# Patient Record
Sex: FEMALE | Race: BLACK OR AFRICAN AMERICAN | NOT HISPANIC OR LATINO | Employment: FULL TIME | URBAN - METROPOLITAN AREA
[De-identification: names, ages, dates, MRNs, and addresses within clinical notes are randomized per-mention and may not be internally consistent; named-entity substitution may affect disease eponyms.]

---

## 2024-09-11 ENCOUNTER — INITIAL PRENATAL (OUTPATIENT)
Dept: OBSTETRICS AND GYNECOLOGY | Facility: CLINIC | Age: 34
End: 2024-09-11
Payer: COMMERCIAL

## 2024-09-11 VITALS — SYSTOLIC BLOOD PRESSURE: 133 MMHG | BODY MASS INDEX: 37.79 KG/M2 | WEIGHT: 220 LBS | DIASTOLIC BLOOD PRESSURE: 71 MMHG

## 2024-09-11 DIAGNOSIS — Z34.90 PRENATAL CARE, ANTEPARTUM (HHS-HCC): ICD-10-CM

## 2024-09-11 DIAGNOSIS — O09.529 ANTEPARTUM MULTIGRAVIDA OF ADVANCED MATERNAL AGE (HHS-HCC): Primary | ICD-10-CM

## 2024-09-11 DIAGNOSIS — Z3A.17 17 WEEKS GESTATION OF PREGNANCY (HHS-HCC): ICD-10-CM

## 2024-09-11 DIAGNOSIS — A60.9 HSV (HERPES SIMPLEX VIRUS) ANOGENITAL INFECTION: ICD-10-CM

## 2024-09-11 DIAGNOSIS — O99.210 MATERNAL OBESITY SYNDROME, ANTEPARTUM (HHS-HCC): ICD-10-CM

## 2024-09-11 DIAGNOSIS — Z36.3: ICD-10-CM

## 2024-09-11 PROBLEM — G43.909 MIGRAINES: Status: ACTIVE | Noted: 2024-09-11

## 2024-09-11 PROBLEM — Z91.410: Status: ACTIVE | Noted: 2024-09-11

## 2024-09-11 PROBLEM — R73.09 ELEVATED HEMOGLOBIN A1C: Status: ACTIVE | Noted: 2024-09-11

## 2024-09-11 PROBLEM — R89.8 ABNORMAL GENETIC TEST: Status: ACTIVE | Noted: 2024-09-11

## 2024-09-11 PROBLEM — Z80.41 FH: OVARIAN CANCER: Status: ACTIVE | Noted: 2024-09-11

## 2024-09-11 PROBLEM — C56.9 OVARIAN CANCER (MULTI): Status: RESOLVED | Noted: 2024-09-11 | Resolved: 2024-09-11

## 2024-09-11 PROBLEM — C56.9 OVARIAN CANCER (MULTI): Status: ACTIVE | Noted: 2024-09-11

## 2024-09-11 PROCEDURE — 0500F INITIAL PRENATAL CARE VISIT: CPT | Performed by: ADVANCED PRACTICE MIDWIFE

## 2024-09-11 ASSESSMENT — ENCOUNTER SYMPTOMS
NAUSEA: 0
FREQUENCY: 0
FLANK PAIN: 0
HEMATURIA: 0
BLOOD IN STOOL: 0
EYES NEGATIVE: 0
DIARRHEA: 0
FATIGUE: 0
PSYCHIATRIC NEGATIVE: 1
PSYCHIATRIC NEGATIVE: 0
GASTROINTESTINAL NEGATIVE: 0
NEUROLOGICAL NEGATIVE: 0
RESPIRATORY NEGATIVE: 1
RESPIRATORY NEGATIVE: 0
ABDOMINAL PAIN: 0
CONSTIPATION: 0
CONSTITUTIONAL NEGATIVE: 0
ANAL BLEEDING: 0
MUSCULOSKELETAL NEGATIVE: 1
FEVER: 0
ENDOCRINE NEGATIVE: 0
CHILLS: 0
OCCASIONAL FEELINGS OF UNSTEADINESS: 0
UNEXPECTED WEIGHT CHANGE: 0
CARDIOVASCULAR NEGATIVE: 0
RECTAL PAIN: 0
MUSCULOSKELETAL NEGATIVE: 0
HEMATOLOGIC/LYMPHATIC NEGATIVE: 0
LOSS OF SENSATION IN FEET: 0
VOMITING: 0
DYSURIA: 0
ALLERGIC/IMMUNOLOGIC NEGATIVE: 0
ACTIVITY CHANGE: 0
DEPRESSION: 0
DIFFICULTY URINATING: 0
ABDOMINAL DISTENTION: 0

## 2024-09-11 ASSESSMENT — EDINBURGH POSTNATAL DEPRESSION SCALE (EPDS)
I HAVE FELT SAD OR MISERABLE: NO, NOT AT ALL
I HAVE BEEN SO UNHAPPY THAT I HAVE BEEN CRYING: NO, NEVER
I HAVE FELT SCARED OR PANICKY FOR NO GOOD REASON: NO, NOT AT ALL
I HAVE BEEN ABLE TO LAUGH AND SEE THE FUNNY SIDE OF THINGS: AS MUCH AS I ALWAYS COULD
THE THOUGHT OF HARMING MYSELF HAS OCCURRED TO ME: NEVER
I HAVE BEEN ANXIOUS OR WORRIED FOR NO GOOD REASON: YES, SOMETIMES
TOTAL SCORE: 2
I HAVE LOOKED FORWARD WITH ENJOYMENT TO THINGS: AS MUCH AS I EVER DID
I HAVE BLAMED MYSELF UNNECESSARILY WHEN THINGS WENT WRONG: NO, NEVER
THINGS HAVE BEEN GETTING ON TOP OF ME: NO, I HAVE BEEN COPING AS WELL AS EVER
I HAVE BEEN SO UNHAPPY THAT I HAVE HAD DIFFICULTY SLEEPING: NOT AT ALL
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
## 2024-09-11 NOTE — PATIENT INSTRUCTIONS
TAKING GOOD CARE OF YOURSELF WHILE YOU ARE PREGNANT    What Should I Eat?  You do not have to eat a lot more food during pregnancy. But it is important to eat the right food--the most  healthy food for you and your baby. Every day, make sure you have:  6 to 8 large glasses of water.  6 to 9 servings of whole grain foods like bread or pasta. By reading the label, you will know that you are getting ‘‘whole’’ grain and not just brown-colored bread or pasta (1 slice of bread or a half cup of cooked pasta is a serving).  3 to 4 servings of fruit. Fresh, raw fruit is best (1 small apple or a half cup of chopped fruit is a serving).  4 to 5 servings of vegetables (1 medium carrot or a half cup of chopped vegetables is a serving).  2 to 3 servings of lean meat, fish, eggs, or nuts. (A piece ofmeat the size of a pack of playing cards is 1 serving.)  1 serving of vitamin C-rich food, like oranges, sweet peppers, or tomatoes (one half cup is a serving).  2 to 3 servings of iron-rich foods, like black-eyed peas, sweet potatoes, greens, dried fruit, or meat.  1 serving of a food rich in folic acid, like dark green, leafy vegetables (one half cup is a serving).    Are Some Foods Dangerous?  Most women can eat any food they want while they are pregnant. But there are some foods that can be  dangerous to the health of your baby.    Fish -- Fish is good food. And it is an important food for growing a smart baby. But some fish have lots of dangerous chemicals. To avoid these chemicals:  Do not eat swordfish, shark, jazmyn mackerel, or tilefish.  Eat salmon no more than 1 time per week.  Eat only ‘‘light’’ tuna. Do not eat albacore tuna.    Milk and cheese -- Dairy products are an important source of calcium, and calcium helps build strong bones and teeth. But some dairy products carry dangerous germs. To keep yourself and your baby safe, eat and drink only dairy products--such as milk, yogurt, and cheese--that are  pasteurized.    Prepared foods -- Any food that is spoiled or not cooked well can make you sick.  Do not eat any meat or fish that has not been cooked all the way through.  Do not eat any cooked food that has not been kept hot or chilled.  Wash knives, cutting boards, and your hands between handling raw meat and any other food--like fruits and vegetables--that you plan to eat raw.  Wash all fruits and vegetables with 1 tablespoon of vinegar in a pan of water to kill germs before you eat them.    Alcohol -- We know that alcohol is dangerous for your baby if you drink a lot during your pregnancy. It is safest to avoid all alcohol.    Coffee -- The most recent studies say that 2 cups of caffeinated drink each day is safe during pregnancy. This means 2 small cups of coffee or tea or 1 can of caffeinated soda.    Weight Gain  On average, the total amount of weight gain during pregnancy will fall in the following ranges:    Weight Type Average Pounds   Normal weight (BMI 18.5 - 24.9) 25 - 35 pounds   Underweight (BMI less than 18.5) 28 - 40 pounds   Overweight (BMI 25 - 29.9) 15 - 25 pounds   Obese (BMI greater or equal to 30) 11 - 20 pounds       Do I Need to Take Vitamins?  Even if your diet is good, a daily multivitamin is a good idea. All prenatal vitamins are pretty much the same,  so buy the cheapest kind. If you find that your vitamins upset your stomach, take a children’s chewable or gummy  vitamin. Be sure you get at least 400 micrograms of folic acid every day in the vitamin you chose. The number  of micrograms of folic acid is on the label of the bottle.    Is Exercise Important?  Yes! You are getting ready for an athletic event: labor! Daily exercise will help you stay fit, control your  weight, and be prepared for labor. Every day, try to get at least 30 minutes of moderate exercise like walking  or swimming. Do deep squats several times a day. This exercise will help control low back pain and help  prepare  your pelvis for delivery.    Are Some Exercises Dangerous?  You can continue to do pretty much any exercise you have been doing. It is important to avoid any danger of  blows to your stomach. You should avoid scuba diving and contact sports.    What if I Get Sick--Can I Take Medicine?  It is important to limit the medicines you take as much as possible. It is safe to take acetaminophen  (Tylenol). Avoid NSAIDs like ibuprofen (Motrin) and naproxen (Aleve).    Head cold -- Drink lots of fluids, gargle with warm salt water, take warm baths or showers, take Tylenol for headache and sore throat, suck on throat lozenges    Headaches -- Drink at least 8 big glasses of water every day, eat something healthy every 2 to 3 hours during the day, and take Tylenol    Constipation -- Drink lots of water, eat lots of fruits and vegetables, including dried fruits like prunes, and use a fiber supplement like Metamucil    Are There Danger Signs That I Need to Watch Out For?  Call your health care provider if:  You start to bleed like a period  You are leaking fluid  Your baby is not moving (after 24 weeks into your pregnancy)  You are having very bad headaches or your vision is blurry or you see ‘‘spots’’  You are having very bad pain  You are feeling very frightened or sad  You are very worried about something  NIPT testing:  NIPT Summary of Recommendations  Prenatal genetic screening (serum screening with or without nuchal translucency [NT] ultrasound or cell-free DNA screening) and diagnostic testing (chorionic villus sampling [CVS] or amniocentesis) options should be discussed and offered to all pregnant patients regardless of maternal age or risk of chromosomal abnormality. After review and discussion, every patient has the right to pursue or decline prenatal genetic screening and diagnostic testing.  If screening is accepted, patients should have one prenatal screening approach, and should not have multiple screening tests  performed simultaneously.  Cell-free DNA is the most sensitive and specific screening test for the common fetal aneuploidies. Nevertheless, it has the potential for false-positive and false-negative results. Furthermore, cell-free DNA testing is not equivalent to diagnostic testing.  All patients should be offered a second-trimester ultrasound for fetal structural defects, since these may occur with or without fetal aneuploidy; ideally this procedure is performed between 18 and 22 weeks of gestation (with or without second?trimester maternal serum alpha?fetoprotein).  Patients with a positive screening test result for fetal aneuploidy should undergo genetic counseling and a comprehensive ultrasound evaluation with an opportunity for diagnostic testing to confirm results.  Patients with a negative screening test result should be made aware that this substantially decreases their risk of the targeted aneuploidy but does not ensure that the fetus is unaffected. The potential for a fetus to be affected by genetic disorders that are not evaluated by the screening or diagnostic test should also be reviewed. Even if patients have a negative screening test result, they may choose diagnostic testing later in pregnancy, particularly if additional findings such as fetal anomalies identified on ultrasound examination become evident.  Patients whose cell-free DNA screening test results are not reported by the laboratory or are uninterpretable (a no?call test result) should be informed that test failure is associated with an increased risk of aneuploidy, receive further genetic counseling and be offered comprehensive ultrasound evaluation and diagnostic testing.  If an enlarged nuchal translucency or an anomaly is identified on ultrasound examination, the patient should be offered genetic counseling and diagnostic testing for genetic conditions and a comprehensive ultrasound evaluation including detailed ultrasonography at 18-22  weeks of gestation to assess for structural abnormalities.  The use of cell-free DNA screening as follow-up for patients with a screen positive serum analyte screening test result is an option for patients who want to avoid a diagnostic test. However, patients should be informed that this approach may delay definitive diagnosis and will fail to identify some fetuses with chromosomal abnormalities.  In clinical situations of an isolated soft ultrasonographic marker (such as echogenic cardiac focus, choroid plexus cyst, pyelectasis, short humerus or femur length) where aneuploidy screening has not been performed, the patient should be counseled regarding the risk of aneuploidy associated with the finding and cell-free DNA, quad screen testing, or amniocentesis should be offered. If aneuploidy testing is performed and is low risk, then no further risk assessment is needed. If more than one marker is identified, then genetic counseling, maternal-fetal medicine consultation, or both are recommended.  No method of aneuploidy screening that includes a serum sample is as accurate in twin gestations as it is in [place] pregnancies; this information should be incorporated into pretest counseling for patients with multiple gestations.  Cell-free DNA screening can be performed in twin pregnancies. Overall, performance of screening for trisomy 21 by cell-free DNA in twin pregnancies is encouraging, but the total number of reported affected cases is small. Given the small number of affected cases it is difficult to determine an accurate detection rate for trisomy 18 and 13.  Because preimplantation genetic testing is not uniformly accurate, prenatal screening and prenatal diagnosis should be offered to all patients regardless of previous preimplantation genetic testing.  The use of multiple serum screening approaches performed independently (eg, a first-trimester screening test followed by a quad screen as an unlinked test) is  not recommended because it will result in an unacceptably high positive screening rate and could deliver contradictory risk estimates.  In multifetal gestations, if a fetal demise, vanishing twin, or anomaly is identified in one fetus, there is a significant risk of an inaccurate test result if serum-based aneuploidy screening or cell-free DNA is used. This information should be reviewed with the patient and diagnostic testing should be offered.  Patients with unusual or multiple aneuploidies detected by cell-free DNA should be referred for genetic counseling and maternal-fetal medicine consultation.  Our contact information is below in case you or your family need to call:  Your health care provider’s name: RUSSEL Shaikh  Your health care provider’s phone number: [phone]

## 2024-09-23 ENCOUNTER — HOSPITAL ENCOUNTER (OUTPATIENT)
Dept: RADIOLOGY | Facility: CLINIC | Age: 34
Discharge: HOME | End: 2024-09-23
Payer: COMMERCIAL

## 2024-09-23 DIAGNOSIS — Z34.90 PRENATAL CARE, ANTEPARTUM (HHS-HCC): ICD-10-CM

## 2024-09-23 PROCEDURE — 76811 OB US DETAILED SNGL FETUS: CPT

## 2024-09-23 PROCEDURE — 76811 OB US DETAILED SNGL FETUS: CPT | Performed by: OBSTETRICS & GYNECOLOGY

## 2024-09-25 ENCOUNTER — APPOINTMENT (OUTPATIENT)
Dept: OBSTETRICS AND GYNECOLOGY | Facility: CLINIC | Age: 34
End: 2024-09-25
Payer: COMMERCIAL

## 2024-10-02 ENCOUNTER — ROUTINE PRENATAL (OUTPATIENT)
Dept: OBSTETRICS AND GYNECOLOGY | Facility: CLINIC | Age: 34
End: 2024-10-02
Payer: COMMERCIAL

## 2024-10-02 VITALS — SYSTOLIC BLOOD PRESSURE: 121 MMHG | WEIGHT: 220 LBS | DIASTOLIC BLOOD PRESSURE: 65 MMHG | BODY MASS INDEX: 37.79 KG/M2

## 2024-10-02 DIAGNOSIS — O99.210 MATERNAL OBESITY SYNDROME, ANTEPARTUM (HHS-HCC): ICD-10-CM

## 2024-10-02 DIAGNOSIS — O09.529 ANTEPARTUM MULTIGRAVIDA OF ADVANCED MATERNAL AGE (HHS-HCC): Primary | ICD-10-CM

## 2024-10-02 DIAGNOSIS — Z3A.20 20 WEEKS GESTATION OF PREGNANCY (HHS-HCC): ICD-10-CM

## 2024-10-02 DIAGNOSIS — R89.8 ABNORMAL GENETIC TEST: ICD-10-CM

## 2024-10-02 DIAGNOSIS — R73.09 ELEVATED HEMOGLOBIN A1C: ICD-10-CM

## 2024-10-02 DIAGNOSIS — A60.9 HSV (HERPES SIMPLEX VIRUS) ANOGENITAL INFECTION: ICD-10-CM

## 2024-10-02 PROCEDURE — 0501F PRENATAL FLOW SHEET: CPT | Performed by: ADVANCED PRACTICE MIDWIFE

## 2024-10-02 NOTE — PROGRESS NOTES
"Assessment/Plan   Diagnoses and all orders for this visit:  Elevated hemoglobin A1c  Antepartum multigravida of advanced maternal age ([facility])  Maternal obesity syndrome, antepartum ([facility])  HSV (herpes simplex virus) anogenital infection  Abnormal genetic test  20 weeks gestation of pregnancy ([facility])      Reviewed lab results 3 hr gct ramon, will repeat 3 hr at 26 weeks   Anatomy US reviewed repeat sono scheduled r/t non vis anatomy     Reviewed s/sx of PTL, warning signs, fetal movement counts, and when to call provider  Follow up in 4 weeks for a routine prenatal visit.    RUSSEL Badillo    Subjective     Geraldine Gaines \"Heena\" is a 34 y.o.  at 20w6d with a working estimated date of delivery of 2025, by Last Menstrual Period who presents for a routine prenatal visit. She denies vaginal bleeding, leakage of fluid, decreased fetal movements, or contractions.    Her pregnancy is complicated by:  16w2d Problems (from 24 to present)       Problem Noted Resolved    Elevated hemoglobin A1c 2024 by RUSSEL Badillo No    Priority:  Medium      Overview Addendum 2024  2:31 PM by RUSSEL Shaikh     2024: 6.2 at NOB. 3 hr gct normal SC           Maternal obesity syndrome, antepartum ([facility]) 2024 by RUSSEL Badillo No    Priority:  Medium      Overview Signed 2024  1:50 PM by RUSSEL Shaikh     2024: BMI = >40 at NOB    Fetal surveillance BMI >40 at NOB:  Growth US at 30 and 36 wks  BPP or NST weekly 34 wks to delivery    Timing of delivery: 39 0/7 - 39 6/7 wks  *BMI >= 50 at any time in pregnancy: Deliver at Level 4             Abnormal genetic test 2024 by RUSSEL Badillo No    Priority:  Medium      Overview Signed 2024  1:50 PM by RUSSEL Shaikh     2024: Increased risk for SMA carrier status. Genetic consult complete. SC           Migraines 2024 by Kacey NUÑEZ" RUSSEL Forte No    Priority:  Medium      Overview Signed 2024  1:59 PM by RUSSEL Shaikh     2024: Tylenol PRN           FH: ovarian cancer 2024 by RUSSEL Badillo No    Priority:  Medium      Overview Signed 2024  2:34 PM by RUSSEL Shaikh     2024: Maternal aunt passed away at age 44 from ovarian cancer. Pt mother is a carrier and had a hysterectomy. Pt has not yet been tested. Will plan My Risk postpartum. SC           Ovarian cancer (Multi) 2024 by RUSSEL Badillo 2024 by RUSSEL Badillo    Priority:  Medium      Overview Signed 2024  2:33 PM by RUSSEL Shaikh     .td                  Objective   Physical Exam  Weight: 99.8 kg (220 lb)  TWG: Not found.  Expected Total Weight Gain: Could not be calculated   Pregravid BMI: Could not be calculated  BP: 121/65         Postpartum Depression: Low Risk  (2024)    [place]  Depression Scale     Last EPDS Total Score: 2     Last EPDS Self Harm Result: Never       Prenatal Labs  Lab Results   Component Value Date    HGB 13.2 02/10/2022    HCT 39.6 02/10/2022     02/10/2022       Imaging  Anatomy sono reviewed     RUSSEL Badillo

## 2024-10-14 ENCOUNTER — APPOINTMENT (OUTPATIENT)
Dept: RADIOLOGY | Facility: CLINIC | Age: 34
End: 2024-10-14
Payer: COMMERCIAL

## 2024-10-16 ENCOUNTER — HOSPITAL ENCOUNTER (OUTPATIENT)
Dept: RADIOLOGY | Facility: HOSPITAL | Age: 34
Discharge: HOME | End: 2024-10-16
Payer: COMMERCIAL

## 2024-10-16 DIAGNOSIS — Z34.90 PRENATAL CARE, ANTEPARTUM (HHS-HCC): ICD-10-CM

## 2024-10-16 PROCEDURE — 76816 OB US FOLLOW-UP PER FETUS: CPT

## 2024-10-30 ENCOUNTER — APPOINTMENT (OUTPATIENT)
Dept: OBSTETRICS AND GYNECOLOGY | Facility: CLINIC | Age: 34
End: 2024-10-30
Payer: COMMERCIAL

## 2024-10-30 VITALS — BODY MASS INDEX: 38.07 KG/M2 | WEIGHT: 221.6 LBS | DIASTOLIC BLOOD PRESSURE: 68 MMHG | SYSTOLIC BLOOD PRESSURE: 110 MMHG

## 2024-10-30 DIAGNOSIS — O09.529 ANTEPARTUM MULTIGRAVIDA OF ADVANCED MATERNAL AGE (HHS-HCC): Primary | ICD-10-CM

## 2024-10-30 DIAGNOSIS — A60.9 HSV (HERPES SIMPLEX VIRUS) ANOGENITAL INFECTION: ICD-10-CM

## 2024-10-30 DIAGNOSIS — R89.8 ABNORMAL GENETIC TEST: ICD-10-CM

## 2024-10-30 DIAGNOSIS — Z3A.24 24 WEEKS GESTATION OF PREGNANCY (HHS-HCC): ICD-10-CM

## 2024-10-30 DIAGNOSIS — O99.210 MATERNAL OBESITY SYNDROME, ANTEPARTUM (HHS-HCC): ICD-10-CM

## 2024-10-30 DIAGNOSIS — R73.09 ELEVATED HEMOGLOBIN A1C: ICD-10-CM

## 2024-10-30 PROCEDURE — 0501F PRENATAL FLOW SHEET: CPT | Performed by: ADVANCED PRACTICE MIDWIFE

## 2024-11-22 ENCOUNTER — LAB (OUTPATIENT)
Dept: LAB | Facility: LAB | Age: 34
End: 2024-11-22
Payer: COMMERCIAL

## 2024-11-22 DIAGNOSIS — Z34.90 PRENATAL CARE, ANTEPARTUM (HHS-HCC): ICD-10-CM

## 2024-11-22 LAB
ERYTHROCYTE [DISTWIDTH] IN BLOOD BY AUTOMATED COUNT: 12.6 % (ref 11.5–14.5)
GLUCOSE 1H P 50 G GLC PO SERPL-MCNC: 84 MG/DL
HCT VFR BLD AUTO: 39.1 % (ref 36–46)
HCV AB SER QL: NONREACTIVE
HGB BLD-MCNC: 13.3 G/DL (ref 12–16)
MCH RBC QN AUTO: 32.6 PG (ref 26–34)
MCHC RBC AUTO-ENTMCNC: 34 G/DL (ref 32–36)
MCV RBC AUTO: 96 FL (ref 80–100)
NRBC BLD-RTO: 0 /100 WBCS (ref 0–0)
PLATELET # BLD AUTO: 223 X10*3/UL (ref 150–450)
RBC # BLD AUTO: 4.08 X10*6/UL (ref 4–5.2)
TREPONEMA PALLIDUM IGG+IGM AB [PRESENCE] IN SERUM OR PLASMA BY IMMUNOASSAY: NONREACTIVE
WBC # BLD AUTO: 7.9 X10*3/UL (ref 4.4–11.3)

## 2024-11-22 PROCEDURE — 36415 COLL VENOUS BLD VENIPUNCTURE: CPT

## 2024-11-22 PROCEDURE — 86780 TREPONEMA PALLIDUM: CPT

## 2024-11-22 PROCEDURE — 86803 HEPATITIS C AB TEST: CPT

## 2024-11-22 PROCEDURE — 85027 COMPLETE CBC AUTOMATED: CPT

## 2024-11-22 PROCEDURE — 82947 ASSAY GLUCOSE BLOOD QUANT: CPT

## 2024-11-27 ENCOUNTER — APPOINTMENT (OUTPATIENT)
Dept: OBSTETRICS AND GYNECOLOGY | Facility: CLINIC | Age: 34
End: 2024-11-27
Payer: COMMERCIAL

## 2024-12-05 ENCOUNTER — HOSPITAL ENCOUNTER (OUTPATIENT)
Dept: RADIOLOGY | Facility: HOSPITAL | Age: 34
Discharge: HOME | End: 2024-12-05
Payer: COMMERCIAL

## 2024-12-05 DIAGNOSIS — O36.5930 MATERNAL CARE FOR OTHER KNOWN OR SUSPECTED POOR FETAL GROWTH, THIRD TRIMESTER, NOT APPLICABLE OR UNSPECIFIED: ICD-10-CM

## 2024-12-05 DIAGNOSIS — O99.213 OBESITY COMPLICATING PREGNANCY, THIRD TRIMESTER (HHS-HCC): ICD-10-CM

## 2024-12-05 DIAGNOSIS — Z34.90 PRENATAL CARE, ANTEPARTUM (HHS-HCC): ICD-10-CM

## 2024-12-05 PROCEDURE — 76816 OB US FOLLOW-UP PER FETUS: CPT

## 2024-12-05 PROCEDURE — 76820 UMBILICAL ARTERY ECHO: CPT

## 2024-12-05 PROCEDURE — 76819 FETAL BIOPHYS PROFIL W/O NST: CPT

## 2024-12-06 PROBLEM — O36.5990 POOR FETAL GROWTH AFFECTING MANAGEMENT OF MOTHER, ANTEPARTUM (HHS-HCC): Status: ACTIVE | Noted: 2024-12-06

## 2024-12-11 ENCOUNTER — ROUTINE PRENATAL (OUTPATIENT)
Dept: OBSTETRICS AND GYNECOLOGY | Facility: CLINIC | Age: 34
End: 2024-12-11
Payer: COMMERCIAL

## 2024-12-11 VITALS — WEIGHT: 216 LBS | BODY MASS INDEX: 37.1 KG/M2 | SYSTOLIC BLOOD PRESSURE: 118 MMHG | DIASTOLIC BLOOD PRESSURE: 70 MMHG

## 2024-12-11 DIAGNOSIS — O99.210 MATERNAL OBESITY SYNDROME, ANTEPARTUM (HHS-HCC): ICD-10-CM

## 2024-12-11 DIAGNOSIS — R89.8 ABNORMAL GENETIC TEST: ICD-10-CM

## 2024-12-11 DIAGNOSIS — Z3A.30 30 WEEKS GESTATION OF PREGNANCY (HHS-HCC): ICD-10-CM

## 2024-12-11 DIAGNOSIS — O09.529 ANTEPARTUM MULTIGRAVIDA OF ADVANCED MATERNAL AGE (HHS-HCC): Primary | ICD-10-CM

## 2024-12-11 DIAGNOSIS — O36.5990 POOR FETAL GROWTH AFFECTING MANAGEMENT OF MOTHER, ANTEPARTUM, SINGLE OR UNSPECIFIED FETUS (HHS-HCC): ICD-10-CM

## 2024-12-11 DIAGNOSIS — A60.9 HSV (HERPES SIMPLEX VIRUS) ANOGENITAL INFECTION: ICD-10-CM

## 2024-12-11 PROCEDURE — 0501F PRENATAL FLOW SHEET: CPT | Performed by: ADVANCED PRACTICE MIDWIFE

## 2024-12-11 RX ORDER — ACYCLOVIR 200 MG/1
CAPSULE ORAL
COMMUNITY
Start: 2021-11-03

## 2024-12-11 NOTE — PROGRESS NOTES
"Assessment/Plan   Diagnoses and all orders for this visit:  Antepartum multigravida of advanced maternal age ([facility])  30 weeks gestation of pregnancy ([facility])  Maternal obesity syndrome, antepartum ([facility])  Poor fetal growth affecting management of mother, antepartum, single or unspecified fetus ([facility])  HSV (herpes simplex virus) anogenital infection  Abnormal genetic test      Reviewed lab results third trimester testing all WNL  Reviewed growth US new diagnosis of IUGR, scheduled for weekly testing with MFM, transfer to OB for care   Postpartum contraception options discussed, undecided, likely planning condoms/withdrawal   Reviewed s/sx of PTL, warning signs, fetal movement counts, and when to call provider  Follow up in 2 week for a routine prenatal visit.    RUSSEL Badillo    Tyra Gaines \"Heena\" is a 34 y.o.  at 30w6d with a working estimated date of delivery of 2025, by Last Menstrual Period who presents for a routine prenatal visit. She denies vaginal bleeding, leakage of fluid, decreased fetal movements, or contractions.    Her pregnancy is complicated by:  16w2d Problems (from 24 to present)       Problem Noted Diagnosed Resolved    Poor fetal growth affecting management of mother, antepartum ([facility]) 2024 by RUSSEL Badillo  No    Priority:  Medium       Overview Addendum 2024 10:59 AM by RUSSEL Badillo     24: Decreased interval fetal growth at 30 weeks. The Ac is at the 2%, and the EFW is at the 7% percentile Repeat doppler, BPP and AFV evaluation scheduled weekly per MFM. SD         30 weeks gestation of pregnancy ([facility]) 2024 by RUSSEL Badillo  No    Priority:  Medium       Overview Addendum 2024  2:48 PM by RUSSEL Badillo     2024: Desired provider in labor: [] CNM  [x] Physician  [x] Initial BMI: >40  [x] Prenatal Labs: Done at [facility]   [x] Rh status: " positive  [x] Genetic Screening:  Increased risk for SMA but declines additional testing, NIPYT not done   [x] Baby ASA  [x] Dating confirmation with US    [x] Anatomy US: ordered   [] Patient added to BirthTracks  [] 1hr GCT at 24-28wks:  [] 3 hr GTT (if indicated):  [] Rhogam (if indicated):   [] Fetal Surveillance (if indicated):    [] Tdap (27-36wks):  [] RSV (32-36wks)  [] Flu Shot:  [] COVID vaccine:     [] Breastfeeding  [] Pain management during labor:   [] Postpartum Birth control method:     [] GBS at 36 wks:  [] 39 weeks discussion of IOL vs. Expectant management:  [] Mode of delivery:              Elevated hemoglobin A1c 9/11/2024 by RUSSEL Badillo  No    Priority:  Medium       Overview Addendum 9/11/2024  2:31 PM by RUSSEL Shaikh     9/11/2024: 6.2 at NOB. 3 hr gct normal SC           Maternal obesity syndrome, antepartum ([facility]) 9/11/2024 by RUSSEL Badillo  No    Priority:  Medium       Overview Signed 9/11/2024  1:50 PM by RUSSEL Shaikh     9/11/2024: BMI = >40 at NOB    Fetal surveillance BMI >40 at NOB:  Growth US at 30 and 36 wks  BPP or NST weekly 34 wks to delivery    Timing of delivery: 39 0/7 - 39 6/7 wks  *BMI >= 50 at any time in pregnancy: Deliver at Level 4             Abnormal genetic test 9/11/2024 by RUSSEL Badillo  No    Priority:  Medium       Overview Signed 9/11/2024  1:50 PM by RUSSEL Shaikh     9/11/2024: Increased risk for SMA carrier status. Genetic consult complete. SC           Migraines 9/11/2024 by RUSSEL Badillo  No    Priority:  Medium       Overview Signed 9/11/2024  1:59 PM by RUSSEL Shaikh     9/11/2024: Tylenol PRN           FH: ovarian cancer 9/11/2024 by RUSSEL Badillo  No    Priority:  Medium       Overview Signed 9/11/2024  2:34 PM by MINA Shaikh-NAYELY     9/11/2024: Maternal aunt passed away at age 44 from ovarian cancer. Pt mother is a  "carrier and had a hysterectomy. Pt has not yet been tested. Will plan My Risk postpartum. SC           Ovarian cancer (Multi) 2024 by RUSSEL Badillo  2024 by RUSSEL Badillo    Priority:  Medium       Overview Signed 2024  2:33 PM by RUSSEL Shaikh     .td                  Objective   Physical Exam:   Weight: 98 kg (216 lb)  TWG: Not found.  Expected Total Weight Gain: Could not be calculated   Pregravid BMI: Could not be calculated  BP: 118/70  Fetal Heart Rate: 155 Fundal Height (cm): 31 cm Presentation: Vertex           Postpartum Depression: Low Risk  (2024)    [place]  Depression Scale     Last EPDS Total Score: 2     Last EPDS Self Harm Result: Never        Prenatal Labs  Lab Results   Component Value Date    HGB 13.3 2024    HCT 39.1 2024     2024    SYPHT Nonreactive 2024     Lab Results   Component Value Date    GLUC1P 84 2024     No results found for: \"GRPBSTREP\"     Imaging  Reviewed new diagnosis of IUGR and need for weeky testing., Scheduled with MFM.   Routine prenatal care with OB    RUSSEL Badillo    "

## 2024-12-12 ENCOUNTER — HOSPITAL ENCOUNTER (OUTPATIENT)
Dept: RADIOLOGY | Facility: HOSPITAL | Age: 34
Discharge: HOME | End: 2024-12-12
Payer: COMMERCIAL

## 2024-12-12 DIAGNOSIS — O99.210 MATERNAL OBESITY SYNDROME, ANTEPARTUM (HHS-HCC): ICD-10-CM

## 2024-12-12 DIAGNOSIS — O36.5930 MATERNAL CARE FOR OTHER KNOWN OR SUSPECTED POOR FETAL GROWTH, THIRD TRIMESTER, NOT APPLICABLE OR UNSPECIFIED: ICD-10-CM

## 2024-12-12 DIAGNOSIS — O09.529 ANTEPARTUM MULTIGRAVIDA OF ADVANCED MATERNAL AGE (HHS-HCC): ICD-10-CM

## 2024-12-12 PROCEDURE — 76820 UMBILICAL ARTERY ECHO: CPT | Performed by: MEDICAL GENETICS

## 2024-12-12 PROCEDURE — 76819 FETAL BIOPHYS PROFIL W/O NST: CPT | Performed by: MEDICAL GENETICS

## 2024-12-12 PROCEDURE — 76815 OB US LIMITED FETUS(S): CPT | Performed by: MEDICAL GENETICS

## 2024-12-12 PROCEDURE — 76815 OB US LIMITED FETUS(S): CPT

## 2024-12-12 PROCEDURE — 76819 FETAL BIOPHYS PROFIL W/O NST: CPT

## 2024-12-19 ENCOUNTER — HOSPITAL ENCOUNTER (OUTPATIENT)
Dept: RADIOLOGY | Facility: HOSPITAL | Age: 34
Discharge: HOME | End: 2024-12-19
Payer: COMMERCIAL

## 2024-12-19 DIAGNOSIS — Z34.90 PRENATAL CARE, ANTEPARTUM (HHS-HCC): ICD-10-CM

## 2024-12-19 PROCEDURE — 76820 UMBILICAL ARTERY ECHO: CPT

## 2024-12-19 PROCEDURE — 76815 OB US LIMITED FETUS(S): CPT

## 2024-12-19 PROCEDURE — 76819 FETAL BIOPHYS PROFIL W/O NST: CPT | Performed by: MEDICAL GENETICS

## 2024-12-19 PROCEDURE — 76820 UMBILICAL ARTERY ECHO: CPT | Performed by: MEDICAL GENETICS

## 2024-12-26 ENCOUNTER — APPOINTMENT (OUTPATIENT)
Dept: RADIOLOGY | Facility: HOSPITAL | Age: 34
End: 2024-12-26
Payer: COMMERCIAL

## 2024-12-26 ENCOUNTER — HOSPITAL ENCOUNTER (OUTPATIENT)
Dept: RADIOLOGY | Facility: HOSPITAL | Age: 34
Discharge: HOME | End: 2024-12-26
Payer: COMMERCIAL

## 2024-12-26 DIAGNOSIS — Z34.90 PRENATAL CARE, ANTEPARTUM (HHS-HCC): ICD-10-CM

## 2024-12-26 DIAGNOSIS — Z34.90 ENCOUNTER FOR SUPERVISION OF NORMAL PREGNANCY, UNSPECIFIED, UNSPECIFIED TRIMESTER: ICD-10-CM

## 2024-12-26 PROCEDURE — 76816 OB US FOLLOW-UP PER FETUS: CPT | Performed by: OBSTETRICS & GYNECOLOGY

## 2024-12-26 PROCEDURE — 76816 OB US FOLLOW-UP PER FETUS: CPT

## 2024-12-26 PROCEDURE — 76819 FETAL BIOPHYS PROFIL W/O NST: CPT | Performed by: OBSTETRICS & GYNECOLOGY

## 2025-01-03 PROBLEM — Z3A.34 34 WEEKS GESTATION OF PREGNANCY (HHS-HCC): Status: ACTIVE | Noted: 2024-09-11

## 2025-01-03 NOTE — PROGRESS NOTES
"Assessment/Plan   Diagnoses and all orders for this visit:  Antepartum multigravida of advanced maternal age ([facility])  Maternal obesity syndrome, antepartum ([facility])  Poor fetal growth affecting management of mother, antepartum, single or unspecified fetus ([facility])  HSV (herpes simplex virus) anogenital infection  34 weeks gestation of pregnancy ([facility])      Reviewed growth US  33 week sono to follow up on FGR than was noted at 30 weeks: Normal interval growth with FGR no longer identified with AC at 17% and overall growth in the 16%. Additional Repeat sono scheduled for 36 weeks.     Pt has been sick with virus for the last two weeks with nausea/vomiting/fever/fatigue. Zofran sent. Discussed bland diet for the next 24-48 hours.     Reviewed s/sx of PTL, warning signs, fetal movement counts, and when to call provider  Follow up in 2 week for a routine prenatal visit.    RUSSEL Badillo    Subjective     Geraldine Gaines \"Heena\" is a 34 y.o.  at 34w1d with a working estimated date of delivery of 2025, by Last Menstrual Period who presents for a routine prenatal visit. She denies vaginal bleeding, leakage of fluid, decreased fetal movements, or contractions.    Her pregnancy is complicated by:  16w2d Problems (from 24 to present)       Problem Noted Diagnosed Resolved    Poor fetal growth affecting management of mother, antepartum ([facility]) 2024 by RUSSEL Badillo  No    Priority:  Medium       Overview Addendum 2024  1:10 PM by RUSSEL Badillo     24: Decreased interval fetal growth at 30 weeks. The Ac is at the 2%, and the EFW is at the 7% percentile Repeat doppler, BPP and AFV evaluation scheduled weekly per [facility]. SD  24: at 33 weeks EFW 16th% with AC at 17th%. SD         34 weeks gestation of pregnancy ([facility]) 2024 by RUSSEL Badillo  No    Priority:  Medium       Overview Addendum 2024  3:21 PM by Kacey NUÑEZ" RUSSEL Forte     9/11/2024: Desired provider in labor: [x] CNM  [x] Physician   [x] Initial BMI: >40  [x] Prenatal Labs: Done at [facility]   [x] Rh status: positive  [x] Genetic Screening:  Increased risk for SMA but declines additional testing, NIPYT not done   [x] Baby ASA  [x] Dating confirmation with US    [x] Anatomy US: ordered   [] Patient added to BirthTracks: Doc pt   [x] 1hr GCT at 24-28wks: normal   [] 3 hr GTT (if indicated):  [x] Rhogam (if indicated): A pos   [x] Fetal Surveillance (if indicated):weekly BPP, weekly dopplers, serial growth     [] Tdap (27-36wks): considering   [] RSV (32-36wks) considering   [] Flu Shot:  [x] COVID vaccine: declines     [x] Breastfeeding  [] Pain management during labor:   [x] Postpartum Birth control method: withdrawl/condoms     [] GBS at 36 wks:  [] 39 weeks discussion of IOL vs. Expectant management:  [] Mode of delivery:              Elevated hemoglobin A1c 9/11/2024 by RUSSEL Badillo  No    Priority:  Medium       Overview Addendum 9/11/2024  2:31 PM by RUSSEL Shaikh     9/11/2024: 6.2 at NOB. 3 hr gct normal SC           Maternal obesity syndrome, antepartum ([facility]) 9/11/2024 by RUSSEL Badillo  No    Priority:  Medium       Overview Signed 9/11/2024  1:50 PM by RUSSEL Shaikh     9/11/2024: BMI = >40 at NOB    Fetal surveillance BMI >40 at NOB:  Growth US at 30 and 36 wks  BPP or NST weekly 34 wks to delivery    Timing of delivery: 39 0/7 - 39 6/7 wks  *BMI >= 50 at any time in pregnancy: Deliver at Level 4             Abnormal genetic test 9/11/2024 by RUSSEL Badillo  No    Priority:  Medium       Overview Signed 9/11/2024  1:50 PM by RUSSEL Shaikh     9/11/2024: Increased risk for SMA carrier status. Genetic consult complete. SC           Migraines 9/11/2024 by RUSSEL Badillo  No    Priority:  Medium       Overview Signed 9/11/2024  1:59 PM by RUSSEL Shaikh      "2024: Tylenol PRN           FH: ovarian cancer 2024 by RUSSEL Badillo  No    Priority:  Medium       Overview Signed 2024  2:34 PM by RUSSEL Shaikh     2024: Maternal aunt passed away at age 44 from ovarian cancer. Pt mother is a carrier and had a hysterectomy. Pt has not yet been tested. Will plan My Risk postpartum. SC           Ovarian cancer (Multi) 2024 by RUSSEL Badillo  2024 by RUSSEL Badillo    Priority:  Medium       Overview Signed 2024  2:33 PM by RUSSEL Shaikh     .td                  Objective   Physical Exam:      TWG: Not found.  Expected Total Weight Gain: Could not be calculated   Pregravid BMI: Could not be calculated                     Postpartum Depression: Low Risk  (2024)    [place]  Depression Scale     Last EPDS Total Score: 2     Last EPDS Self Harm Result: Never        Prenatal Labs  Lab Results   Component Value Date    HGB 13.3 2024    HCT 39.1 2024     2024    SYPHT Nonreactive 2024     Lab Results   Component Value Date    GLUC1P 84 2024     No results found for: \"GRPBSTREP\"     Imaging  36 weeks sono scheduled.   WU in 2 weeks     RUSSEL Badillo    "

## 2025-01-08 ENCOUNTER — PHARMACY VISIT (OUTPATIENT)
Dept: PHARMACY | Facility: CLINIC | Age: 35
End: 2025-01-08
Payer: COMMERCIAL

## 2025-01-08 ENCOUNTER — ROUTINE PRENATAL (OUTPATIENT)
Dept: OBSTETRICS AND GYNECOLOGY | Facility: CLINIC | Age: 35
End: 2025-01-08
Payer: COMMERCIAL

## 2025-01-08 VITALS — WEIGHT: 202 LBS | BODY MASS INDEX: 34.7 KG/M2 | DIASTOLIC BLOOD PRESSURE: 69 MMHG | SYSTOLIC BLOOD PRESSURE: 113 MMHG

## 2025-01-08 DIAGNOSIS — O21.9 NAUSEA AND VOMITING IN PREGNANCY PRIOR TO 22 WEEKS GESTATION: ICD-10-CM

## 2025-01-08 DIAGNOSIS — O36.5990 POOR FETAL GROWTH AFFECTING MANAGEMENT OF MOTHER, ANTEPARTUM, SINGLE OR UNSPECIFIED FETUS (HHS-HCC): ICD-10-CM

## 2025-01-08 DIAGNOSIS — O99.210 MATERNAL OBESITY SYNDROME, ANTEPARTUM (HHS-HCC): ICD-10-CM

## 2025-01-08 DIAGNOSIS — A60.9 HSV (HERPES SIMPLEX VIRUS) ANOGENITAL INFECTION: ICD-10-CM

## 2025-01-08 DIAGNOSIS — O09.529 ANTEPARTUM MULTIGRAVIDA OF ADVANCED MATERNAL AGE (HHS-HCC): Primary | ICD-10-CM

## 2025-01-08 DIAGNOSIS — Z3A.34 34 WEEKS GESTATION OF PREGNANCY (HHS-HCC): ICD-10-CM

## 2025-01-08 PROCEDURE — RXMED WILLOW AMBULATORY MEDICATION CHARGE

## 2025-01-08 PROCEDURE — 0501F PRENATAL FLOW SHEET: CPT | Performed by: ADVANCED PRACTICE MIDWIFE

## 2025-01-08 RX ORDER — ONDANSETRON 4 MG/1
4 TABLET, FILM COATED ORAL EVERY 8 HOURS PRN
Qty: 30 TABLET | Refills: 1 | Status: SHIPPED | OUTPATIENT
Start: 2025-01-08

## 2025-01-16 ENCOUNTER — HOSPITAL ENCOUNTER (OUTPATIENT)
Dept: RADIOLOGY | Facility: HOSPITAL | Age: 35
Discharge: HOME | End: 2025-01-16
Payer: COMMERCIAL

## 2025-01-16 DIAGNOSIS — Z34.90 PRENATAL CARE, ANTEPARTUM (HHS-HCC): ICD-10-CM

## 2025-01-16 PROCEDURE — 76819 FETAL BIOPHYS PROFIL W/O NST: CPT

## 2025-01-16 PROCEDURE — 76816 OB US FOLLOW-UP PER FETUS: CPT

## 2025-01-17 PROBLEM — Z3A.36 36 WEEKS GESTATION OF PREGNANCY (HHS-HCC): Status: ACTIVE | Noted: 2024-09-11

## 2025-01-18 NOTE — PROGRESS NOTES
"Assessment/Plan   Diagnoses and all orders for this visit:  Maternal obesity syndrome, antepartum ([facility])  Antepartum multigravida of advanced maternal age ([facility])  -     Fetal nonstress test; Future  Poor fetal growth affecting management of mother, antepartum, fetus 1 of multiple gestation ([facility])  HSV (herpes simplex virus) anogenital infection  -     valACYclovir (Valtrex) 1 gram tablet; Take 0.5 tablets (500 mg) by mouth 2 times a day.  36 weeks gestation of pregnancy ([facility])  -     Group B Streptococcus (GBS) Prenatal Screen, Culture  Reports new onset of intermittent double vision and right epigastric pain. No headache. Normotensive today. No increase in edema.Plan labs today.   NST today: RNST, Cat 1   Reviewed growth US Normal AGA at 36 weeks  Discussed recommendation for IOL in setting of AMA  Willing to accept IOL at 40 weeks unless medically indicated sooner   Reviewed s/sx of labor, warning signs, fetal movement counts, and when to call provider  Follow up in 1 week for a routine prenatal visit with NST    RUSSEL Badillo    Subjective     Geraldine Gaines \"Heena\" is a 34 y.o.  at 36w1d with a working estimated date of delivery of 2025, by Last Menstrual Period who presents for a routine prenatal visit. She denies vaginal bleeding, leakage of fluid, decreased fetal movements, or contractions.    Her pregnancy is complicated by:  16w2d Problems (from 24 to present)       Problem Noted Diagnosed Resolved    Poor fetal growth affecting management of mother, antepartum ([facility]) 2024 by RUSSEL Badillo  No    Priority:  Medium       Overview Addendum 2025  7:37 PM by RUSSEL Badillo     24: Decreased interval fetal growth at 30 weeks. The Ac is at the 2%, and the EFW is at the 7% percentile Repeat doppler, BPP and AFV evaluation scheduled weekly per [facility]. SD  24: at 33 weeks EFW 16th% with AC at 17th%. SD  25: 36 week " Detail Level: Detailed sono: Appropriate fetal growth: 2526 g at the 22%, AC 15%, normal AVELINO. Continue weekly NST per AMA protocol. SD         36 weeks gestation of pregnancy ([facility]) 9/11/2024 by RUSSEL Badillo  No    Priority:  Medium       Overview Addendum 12/11/2024  3:21 PM by RUSSEL Badillo     9/11/2024: Desired provider in labor: [x] CNM  [x] Physician   [x] Initial BMI: >40  [x] Prenatal Labs: Done at [facility]   [x] Rh status: positive  [x] Genetic Screening:  Increased risk for SMA but declines additional testing, NIPYT not done   [x] Baby ASA  [x] Dating confirmation with US    [x] Anatomy US: ordered   [] Patient added to BirthTracks: Doc pt   [x] 1hr GCT at 24-28wks: normal   [] 3 hr GTT (if indicated):  [x] Rhogam (if indicated): A pos   [x] Fetal Surveillance (if indicated):weekly BPP, weekly dopplers, serial growth     [] Tdap (27-36wks): considering   [] RSV (32-36wks) considering   [] Flu Shot:  [x] COVID vaccine: declines     [x] Breastfeeding  [] Pain management during labor:   [x] Postpartum Birth control method: withdrawl/condoms     [] GBS at 36 wks:  [] 39 weeks discussion of IOL vs. Expectant management:  [] Mode of delivery:              Elevated hemoglobin A1c 9/11/2024 by RUSSEL Badillo  No    Priority:  Medium       Overview Addendum 9/11/2024  2:31 PM by RUSSEL Shaikh     9/11/2024: 6.2 at NOB. 3 hr gct normal SC           Maternal obesity syndrome, antepartum ([facility]) 9/11/2024 by RUSSEL Badillo  No    Priority:  Medium       Overview Signed 9/11/2024  1:50 PM by RUSSEL Shaikh     9/11/2024: BMI = >40 at NOB    Fetal surveillance BMI >40 at NOB:  Growth US at 30 and 36 wks  BPP or NST weekly 34 wks to delivery    Timing of delivery: 39 0/7 - 39 6/7 wks  *BMI >= 50 at any time in pregnancy: Deliver at Level 4             Abnormal genetic test 9/11/2024 by Kacey Forte, MINA-NAYELY  No    Priority:  Medium       Overview Signed  "2024  1:50 PM by RUSSEL Shaikh     2024: Increased risk for SMA carrier status. Genetic consult complete. SC           Migraines 2024 by RUSSEL Badillo  No    Priority:  Medium       Overview Signed 2024  1:59 PM by RUSSEL Shaikh     2024: Tylenol PRN           FH: ovarian cancer 2024 by RUSSEL Badillo  No    Priority:  Medium       Overview Signed 2024  2:34 PM by RUSSEL Shaikh     2024: Maternal aunt passed away at age 44 from ovarian cancer. Pt mother is a carrier and had a hysterectomy. Pt has not yet been tested. Will plan My Risk postpartum. SC           Ovarian cancer (Multi) 2024 by RUSSEL Badillo  2024 by RUSSEL Badillo    Priority:  Medium       Overview Signed 2024  2:33 PM by RUSSEL Shaikh     .td                  Objective   Physical Exam:      TWG: Not found.  Expected Total Weight Gain: Could not be calculated   Pregravid BMI: Could not be calculated                     Postpartum Depression: Low Risk  (2024)    [place]  Depression Scale     Last EPDS Total Score: 2     Last EPDS Self Harm Result: Never        Prenatal Labs  Lab Results   Component Value Date    HGB 13.3 2024    HCT 39.1 2024     2024    SYPHT Nonreactive 2024     Lab Results   Component Value Date    GLUC1P 84 2024     No results found for: \"GRPBSTREP\"     Imaging  Most recent growth sono normal. Weekly NST        "

## 2025-01-22 ENCOUNTER — ROUTINE PRENATAL (OUTPATIENT)
Dept: OBSTETRICS AND GYNECOLOGY | Facility: CLINIC | Age: 35
End: 2025-01-22
Payer: COMMERCIAL

## 2025-01-22 VITALS — DIASTOLIC BLOOD PRESSURE: 60 MMHG | SYSTOLIC BLOOD PRESSURE: 119 MMHG | BODY MASS INDEX: 37.28 KG/M2 | WEIGHT: 217 LBS

## 2025-01-22 DIAGNOSIS — H53.8 BLURRED VISION: ICD-10-CM

## 2025-01-22 DIAGNOSIS — O36.5991 POOR FETAL GROWTH AFFECTING MANAGEMENT OF MOTHER, ANTEPARTUM, FETUS 1 OF MULTIPLE GESTATION (HHS-HCC): ICD-10-CM

## 2025-01-22 DIAGNOSIS — O09.529 ANTEPARTUM MULTIGRAVIDA OF ADVANCED MATERNAL AGE (HHS-HCC): ICD-10-CM

## 2025-01-22 DIAGNOSIS — O99.210 MATERNAL OBESITY SYNDROME, ANTEPARTUM (HHS-HCC): Primary | ICD-10-CM

## 2025-01-22 DIAGNOSIS — Z3A.36 36 WEEKS GESTATION OF PREGNANCY (HHS-HCC): ICD-10-CM

## 2025-01-22 DIAGNOSIS — Z23 NEED FOR DIPHTHERIA-TETANUS-PERTUSSIS (TDAP) VACCINE: ICD-10-CM

## 2025-01-22 DIAGNOSIS — A60.9 HSV (HERPES SIMPLEX VIRUS) ANOGENITAL INFECTION: ICD-10-CM

## 2025-01-22 PROCEDURE — 0501F PRENATAL FLOW SHEET: CPT | Performed by: ADVANCED PRACTICE MIDWIFE

## 2025-01-22 PROCEDURE — 87081 CULTURE SCREEN ONLY: CPT | Performed by: ADVANCED PRACTICE MIDWIFE

## 2025-01-22 PROCEDURE — RXMED WILLOW AMBULATORY MEDICATION CHARGE

## 2025-01-22 PROCEDURE — 90715 TDAP VACCINE 7 YRS/> IM: CPT | Performed by: ADVANCED PRACTICE MIDWIFE

## 2025-01-22 RX ORDER — VALACYCLOVIR HYDROCHLORIDE 500 MG/1
500 TABLET, FILM COATED ORAL 2 TIMES DAILY
Qty: 120 TABLET | Refills: 1 | Status: SHIPPED | OUTPATIENT
Start: 2025-01-22

## 2025-01-23 ENCOUNTER — LAB (OUTPATIENT)
Dept: LAB | Facility: LAB | Age: 35
End: 2025-01-23
Payer: COMMERCIAL

## 2025-01-23 DIAGNOSIS — H53.8 BLURRED VISION: ICD-10-CM

## 2025-01-23 LAB
ALBUMIN SERPL BCP-MCNC: 3.2 G/DL (ref 3.4–5)
ALP SERPL-CCNC: 175 U/L (ref 33–110)
ALT SERPL W P-5'-P-CCNC: 7 U/L (ref 7–45)
ANION GAP SERPL CALC-SCNC: 12 MMOL/L (ref 10–20)
AST SERPL W P-5'-P-CCNC: 13 U/L (ref 9–39)
BILIRUB SERPL-MCNC: 1.4 MG/DL (ref 0–1.2)
BUN SERPL-MCNC: 3 MG/DL (ref 6–23)
CALCIUM SERPL-MCNC: 9.5 MG/DL (ref 8.6–10.6)
CHLORIDE SERPL-SCNC: 106 MMOL/L (ref 98–107)
CO2 SERPL-SCNC: 25 MMOL/L (ref 21–32)
CREAT SERPL-MCNC: 0.5 MG/DL (ref 0.5–1.05)
CREAT UR-MCNC: 49.4 MG/DL (ref 20–320)
EGFRCR SERPLBLD CKD-EPI 2021: >90 ML/MIN/1.73M*2
ERYTHROCYTE [DISTWIDTH] IN BLOOD BY AUTOMATED COUNT: 12.8 % (ref 11.5–14.5)
GLUCOSE SERPL-MCNC: 60 MG/DL (ref 74–99)
HCT VFR BLD AUTO: 38.3 % (ref 36–46)
HGB BLD-MCNC: 13.2 G/DL (ref 12–16)
LDH SERPL L TO P-CCNC: 137 U/L (ref 84–246)
MCH RBC QN AUTO: 32.3 PG (ref 26–34)
MCHC RBC AUTO-ENTMCNC: 34.5 G/DL (ref 32–36)
MCV RBC AUTO: 94 FL (ref 80–100)
NRBC BLD-RTO: 0 /100 WBCS (ref 0–0)
PLATELET # BLD AUTO: 210 X10*3/UL (ref 150–450)
POTASSIUM SERPL-SCNC: 4 MMOL/L (ref 3.5–5.3)
PROT SERPL-MCNC: 5.6 G/DL (ref 6.4–8.2)
RBC # BLD AUTO: 4.09 X10*6/UL (ref 4–5.2)
SODIUM SERPL-SCNC: 139 MMOL/L (ref 136–145)
WBC # BLD AUTO: 7.8 X10*3/UL (ref 4.4–11.3)

## 2025-01-23 PROCEDURE — 80053 COMPREHEN METABOLIC PANEL: CPT

## 2025-01-23 PROCEDURE — 82570 ASSAY OF URINE CREATININE: CPT

## 2025-01-23 PROCEDURE — 85027 COMPLETE CBC AUTOMATED: CPT

## 2025-01-23 PROCEDURE — 83615 LACTATE (LD) (LDH) ENZYME: CPT

## 2025-01-25 LAB — GP B STREP GENITAL QL CULT: NORMAL

## 2025-01-28 PROBLEM — Z3A.37 37 WEEKS GESTATION OF PREGNANCY (HHS-HCC): Status: ACTIVE | Noted: 2024-09-11

## 2025-01-28 NOTE — PROGRESS NOTES
"Assessment/Plan   Diagnoses and all orders for this visit:  37 weeks gestation of pregnancy ([facility])  -     POCT UA Automated manually resulted  Antepartum multigravida of advanced maternal age ([facility])  Maternal obesity syndrome, antepartum ([facility])  HSV (herpes simplex virus) anogenital infection  Abnormal genetic test    NST today: RNST, Cat 1   C/O Vaginal itching and irritation: Wet mount pos for hyphal yeast, neg all other pathogens   Reviewed growth US WNL  Discussed expectant management vs. scheduling term IOL, IOL scheduled   Reviewed s/sx of labor, warning signs, fetal movement counts, and when to call provider  Follow up in 1 week for a routine prenatal visit and NST    RUSSEL Badillo    Subjective     Geraldine Gaines \"Heena\" is a 34 y.o.  at 37w5d with a working estimated date of delivery of 2025, by Last Menstrual Period who presents for a routine prenatal visit. She denies vaginal bleeding, leakage of fluid, decreased fetal movements, or contractions.    Her pregnancy is complicated by:  16w2d Problems (from 24 to present)       Problem Noted Diagnosed Resolved    Poor fetal growth affecting management of mother, antepartum ([facility]) 2024 by RUSSEL Badillo  No    Priority:  Medium       Overview Addendum 2025  7:37 PM by RUSSEL Badillo     24: Decreased interval fetal growth at 30 weeks. The Ac is at the 2%, and the EFW is at the 7% percentile Repeat doppler, BPP and AFV evaluation scheduled weekly per [facility]. SD  24: at 33 weeks EFW 16th% with AC at 17th%. SD  25: 36 week sono: Appropriate fetal growth: 2526 g at the 22%, AC 15%, normal AVELINO. Continue weekly NST per A protocol. SD         37 weeks gestation of pregnancy ([facility]) 2024 by RUSSEL Badillo  No    Priority:  Medium       Overview Addendum 2025  1:03 PM by RUSSEL Badillo     2024: Desired provider in labor: [x] NAYELY  [x] " Physician   [x] Initial BMI: >40  [x] Prenatal Labs: Done at [facility]   [x] Rh status: positive  [x] Genetic Screening:  Increased risk for SMA but declines additional testing, NIPT not done   [x] Baby ASA  [x] Dating confirmation with US    [x] Anatomy US: ordered   [] Patient added to BirthTracks: Doc pt   [x] 1hr GCT at 24-28wks: normal   [] 3 hr GTT (if indicated):  [x] Rhogam (if indicated): A pos   [x] Fetal Surveillance (if indicated):weekly BPP, weekly dopplers, serial growth     [] Tdap (27-36wks): considering   [] RSV (32-36wks) considering   [] Flu Shot:  [x] COVID vaccine: declines     [x] Breastfeeding  [] Pain management during labor:   [x] Postpartum Birth control method: withdrawl/condoms     [] GBS at 36 wks:  [] 39 weeks discussion of IOL vs. Expectant management:  [] Mode of delivery:              Elevated hemoglobin A1c 9/11/2024 by RUSSEL Badillo  No    Priority:  Medium       Overview Addendum 9/11/2024  2:31 PM by RUSSEL Shaikh     9/11/2024: 6.2 at NOB. 3 hr gct normal SC           Maternal obesity syndrome, antepartum ([facility]) 9/11/2024 by RUSSEL Badillo  No    Priority:  Medium       Overview Signed 9/11/2024  1:50 PM by RUSSEL Shaikh     9/11/2024: BMI = >40 at NOB    Fetal surveillance BMI >40 at NOB:  Growth US at 30 and 36 wks  BPP or NST weekly 34 wks to delivery    Timing of delivery: 39 0/7 - 39 6/7 wks  *BMI >= 50 at any time in pregnancy: Deliver at Level 4             Abnormal genetic test 9/11/2024 by RUSSEL Badillo  No    Priority:  Medium       Overview Signed 9/11/2024  1:50 PM by RUSSEL Shaikh     9/11/2024: Increased risk for SMA carrier status. Genetic consult complete. SC           Migraines 9/11/2024 by RUSSEL Badillo  No    Priority:  Medium       Overview Signed 9/11/2024  1:59 PM by Kacey Locke, APRN-NAYELY     9/11/2024: Tylenol PRN           FH: ovarian cancer 9/11/2024 by Kacey  RUSSEL Segal  No    Priority:  Medium       Overview Signed 2024  2:34 PM by RUSSEL Shaikh     2024: Maternal aunt passed away at age 44 from ovarian cancer. Pt mother is a carrier and had a hysterectomy. Pt has not yet been tested. Will plan My Risk postpartum. SC           Ovarian cancer (Multi) 2024 by RUSSEL Badillo  2024 by RUSSEL Badillo    Priority:  Medium       Overview Signed 2024  2:33 PM by RUSSEL Shaikh     .td                  Objective   Physical Exam:   Weight: 98 kg (216 lb)  TWG: Not found.  Expected Total Weight Gain: Could not be calculated   Pregravid BMI: Could not be calculated  BP: 122/64  Fetal Heart Rate: RNST Fundal Height (cm): 37 cm Presentation: Vertex  Dilation: Closed Effacement (%): 50 Fetal Station: -2    Postpartum Depression: Low Risk  (2024)    [place]  Depression Scale     Last EPDS Total Score: 2     Last EPDS Self Harm Result: Never        Prenatal Labs  Lab Results   Component Value Date    HGB 13.2 2025    HCT 38.3 2025     2025    SYPHT Nonreactive 2024     Lab Results   Component Value Date    GLUC1P 84 2024     Group B Strep Screen   Date Value Ref Range Status   2025 No Group B Streptococcus (GBS) isolated  Final        WU in 1 week with NST    RUSSEL Badillo

## 2025-01-29 ENCOUNTER — ROUTINE PRENATAL (OUTPATIENT)
Dept: OBSTETRICS AND GYNECOLOGY | Facility: CLINIC | Age: 35
End: 2025-01-29
Payer: COMMERCIAL

## 2025-01-29 VITALS — WEIGHT: 216 LBS | DIASTOLIC BLOOD PRESSURE: 64 MMHG | SYSTOLIC BLOOD PRESSURE: 122 MMHG | BODY MASS INDEX: 37.1 KG/M2

## 2025-01-29 DIAGNOSIS — O09.529 ANTEPARTUM MULTIGRAVIDA OF ADVANCED MATERNAL AGE (HHS-HCC): ICD-10-CM

## 2025-01-29 DIAGNOSIS — A60.9 HSV (HERPES SIMPLEX VIRUS) ANOGENITAL INFECTION: ICD-10-CM

## 2025-01-29 DIAGNOSIS — O99.210 MATERNAL OBESITY SYNDROME, ANTEPARTUM (HHS-HCC): ICD-10-CM

## 2025-01-29 DIAGNOSIS — Z3A.37 37 WEEKS GESTATION OF PREGNANCY (HHS-HCC): Primary | ICD-10-CM

## 2025-01-29 DIAGNOSIS — B37.31 VAGINAL YEAST INFECTION: ICD-10-CM

## 2025-01-29 DIAGNOSIS — R89.8 ABNORMAL GENETIC TEST: ICD-10-CM

## 2025-01-29 LAB
POC BLOOD, URINE: NEGATIVE
POC GLUCOSE, URINE: NEGATIVE MG/DL
POC KETONES, URINE: NEGATIVE MG/DL
POC LEUKOCYTES, URINE: NEGATIVE
POC NITRITE,URINE: NEGATIVE
POC PROTEIN, URINE: NEGATIVE MG/DL

## 2025-01-29 PROCEDURE — RXMED WILLOW AMBULATORY MEDICATION CHARGE

## 2025-01-29 PROCEDURE — 81003 URINALYSIS AUTO W/O SCOPE: CPT | Performed by: ADVANCED PRACTICE MIDWIFE

## 2025-01-29 PROCEDURE — 0501F PRENATAL FLOW SHEET: CPT | Performed by: ADVANCED PRACTICE MIDWIFE

## 2025-01-29 RX ORDER — TERCONAZOLE 8 MG/G
1 CREAM VAGINAL NIGHTLY
Qty: 20 G | Refills: 0 | Status: SHIPPED | OUTPATIENT
Start: 2025-01-29 | End: 2025-02-01

## 2025-02-03 ENCOUNTER — PHARMACY VISIT (OUTPATIENT)
Dept: PHARMACY | Facility: CLINIC | Age: 35
End: 2025-02-03
Payer: COMMERCIAL

## 2025-02-04 PROBLEM — Z3A.38 38 WEEKS GESTATION OF PREGNANCY (HHS-HCC): Status: ACTIVE | Noted: 2024-09-11

## 2025-02-04 NOTE — PROGRESS NOTES
"Assessment/Plan   Diagnoses and all orders for this visit:  Maternal obesity syndrome, antepartum ([facility])  Antepartum multigravida of advanced maternal age ([facility])  38 weeks gestation of pregnancy ([facility])  HSV (herpes simplex virus) anogenital infection  Abnormal genetic test      IOL scheduled for 40 week per pt request  NST today:   REACTIVE, Cat 1   Reviewed s/sx of labor, warning signs, fetal movement counts, and when to call provider  Scheduled for 40 week IOL. Discussed process of IOL, all questions and concerns addressed    RUSSEL Badillo    Subjective     Geraldine Gaines \"Heena\" is a 34 y.o.  at 38w5d with a working estimated date of delivery of 2025, by Last Menstrual Period who presents for a routine prenatal visit. She denies vaginal bleeding, leakage of fluid, decreased fetal movements, or contractions.    Her pregnancy is complicated by:  16w2d Problems (from 24 to present)       Problem Noted Diagnosed Resolved    Poor fetal growth affecting management of mother, antepartum ([facility]) 2024 by RUSSEL Badillo  No    Priority:  Medium       Overview Addendum 2025  7:37 PM by RUSSEL Badillo     24: Decreased interval fetal growth at 30 weeks. The Ac is at the 2%, and the EFW is at the 7% percentile Repeat doppler, BPP and AFV evaluation scheduled weekly per M. SD  24: at 33 weeks EFW 16th% with AC at 17th%. SD  25: 36 week sono: Appropriate fetal growth: 2526 g at the 22%, AC 15%, normal AVELINO. Continue weekly NST per A protocol. SD         38 weeks gestation of pregnancy ([facility]) 2024 by RUSSEL Badillo  No    Priority:  Medium       Overview Addendum 2025  1:03 PM by RUSSEL Badillo     2024: Desired provider in labor: [x] CNM  [x] Physician   [x] Initial BMI: >40  [x] Prenatal Labs: Done at [facility]   [x] Rh status: positive  [x] Genetic Screening:  Increased risk for SMA but " declines additional testing, NIPT not done   [x] Baby ASA  [x] Dating confirmation with US    [x] Anatomy US: ordered   [] Patient added to BirthTracks: Doc pt   [x] 1hr GCT at 24-28wks: normal   [] 3 hr GTT (if indicated):  [x] Rhogam (if indicated): A pos   [x] Fetal Surveillance (if indicated):weekly BPP, weekly dopplers, serial growth     [] Tdap (27-36wks): considering   [] RSV (32-36wks) considering   [] Flu Shot:  [x] COVID vaccine: declines     [x] Breastfeeding  [] Pain management during labor:   [x] Postpartum Birth control method: withdrawl/condoms     [] GBS at 36 wks:  [] 39 weeks discussion of IOL vs. Expectant management:  [] Mode of delivery:              Elevated hemoglobin A1c 9/11/2024 by RUSSEL Badillo  No    Priority:  Medium       Overview Addendum 9/11/2024  2:31 PM by RUSSEL Shaikh     9/11/2024: 6.2 at NOB. 3 hr gct normal SC           Maternal obesity syndrome, antepartum ([facility]) 9/11/2024 by RUSSEL Badillo  No    Priority:  Medium       Overview Signed 9/11/2024  1:50 PM by RUSSEL Shaikh     9/11/2024: BMI = >40 at NOB    Fetal surveillance BMI >40 at NOB:  Growth US at 30 and 36 wks  BPP or NST weekly 34 wks to delivery    Timing of delivery: 39 0/7 - 39 6/7 wks  *BMI >= 50 at any time in pregnancy: Deliver at Level 4             Abnormal genetic test 9/11/2024 by RUSSEL Badillo  No    Priority:  Medium       Overview Signed 9/11/2024  1:50 PM by RUSSEL Shaihk     9/11/2024: Increased risk for SMA carrier status. Genetic consult complete. SC           Migraines 9/11/2024 by RUSSEL Badillo  No    Priority:  Medium       Overview Signed 9/11/2024  1:59 PM by RUSSEL Shaikh     9/11/2024: Tylenol PRN           FH: ovarian cancer 9/11/2024 by RUSSEL Badillo  No    Priority:  Medium       Overview Signed 9/11/2024  2:34 PM by RUSSEL Shaikh     9/11/2024: Maternal aunt  passed away at age 44 from ovarian cancer. Pt mother is a carrier and had a hysterectomy. Pt has not yet been tested. Will plan My Risk postpartum. SC           Ovarian cancer (Multi) 2024 by RUSSEL Badillo  2024 by RUSSEL Badillo    Priority:  Medium       Overview Signed 2024  2:33 PM by RUSSEL Shaikh     .td                  Objective   Physical Exam:      TWG: Not found.  Expected Total Weight Gain: Could not be calculated   Pregravid BMI: Could not be calculated                     Postpartum Depression: Low Risk  (2024)    [place]  Depression Scale     Last EPDS Total Score: 2     Last EPDS Self Harm Result: Never        Prenatal Labs  Lab Results   Component Value Date    HGB 13.2 2025    HCT 38.3 2025     2025    SYPHT Nonreactive 2024     Lab Results   Component Value Date    GLUC1P 84 2024     Group B Strep Screen   Date Value Ref Range Status   2025 No Group B Streptococcus (GBS) isolated  Final        RUSSEL Badillo

## 2025-02-05 ENCOUNTER — PROCEDURE VISIT (OUTPATIENT)
Dept: OBSTETRICS AND GYNECOLOGY | Facility: CLINIC | Age: 35
End: 2025-02-05
Payer: COMMERCIAL

## 2025-02-05 VITALS — SYSTOLIC BLOOD PRESSURE: 115 MMHG | WEIGHT: 217 LBS | DIASTOLIC BLOOD PRESSURE: 66 MMHG | BODY MASS INDEX: 37.28 KG/M2

## 2025-02-05 DIAGNOSIS — R89.8 ABNORMAL GENETIC TEST: ICD-10-CM

## 2025-02-05 DIAGNOSIS — A60.9 HSV (HERPES SIMPLEX VIRUS) ANOGENITAL INFECTION: ICD-10-CM

## 2025-02-05 DIAGNOSIS — Z3A.38 38 WEEKS GESTATION OF PREGNANCY (HHS-HCC): ICD-10-CM

## 2025-02-05 DIAGNOSIS — O09.529 ANTEPARTUM MULTIGRAVIDA OF ADVANCED MATERNAL AGE (HHS-HCC): ICD-10-CM

## 2025-02-05 DIAGNOSIS — O99.210 MATERNAL OBESITY SYNDROME, ANTEPARTUM (HHS-HCC): Primary | ICD-10-CM

## 2025-02-05 PROCEDURE — 99213 OFFICE O/P EST LOW 20 MIN: CPT | Performed by: ADVANCED PRACTICE MIDWIFE

## 2025-02-06 ENCOUNTER — PREP FOR PROCEDURE (OUTPATIENT)
Dept: OBSTETRICS AND GYNECOLOGY | Facility: CLINIC | Age: 35
End: 2025-02-06
Payer: COMMERCIAL

## 2025-02-06 RX ORDER — ONDANSETRON 4 MG/1
4 TABLET, FILM COATED ORAL EVERY 6 HOURS PRN
OUTPATIENT
Start: 2025-02-06

## 2025-02-06 RX ORDER — OXYTOCIN 10 [USP'U]/ML
10 INJECTION, SOLUTION INTRAMUSCULAR; INTRAVENOUS ONCE AS NEEDED
OUTPATIENT
Start: 2025-02-06

## 2025-02-06 RX ORDER — LIDOCAINE HYDROCHLORIDE 10 MG/ML
30 INJECTION, SOLUTION INFILTRATION; PERINEURAL ONCE AS NEEDED
OUTPATIENT
Start: 2025-02-06

## 2025-02-06 RX ORDER — LABETALOL HYDROCHLORIDE 5 MG/ML
20 INJECTION, SOLUTION INTRAVENOUS ONCE AS NEEDED
OUTPATIENT
Start: 2025-02-06

## 2025-02-06 RX ORDER — METHYLERGONOVINE MALEATE 0.2 MG/ML
0.2 INJECTION INTRAVENOUS ONCE AS NEEDED
OUTPATIENT
Start: 2025-02-06

## 2025-02-06 RX ORDER — CARBOPROST TROMETHAMINE 250 UG/ML
250 INJECTION, SOLUTION INTRAMUSCULAR ONCE AS NEEDED
OUTPATIENT
Start: 2025-02-06

## 2025-02-06 RX ORDER — MISOPROSTOL 200 UG/1
800 TABLET ORAL ONCE AS NEEDED
OUTPATIENT
Start: 2025-02-06

## 2025-02-06 RX ORDER — TERBUTALINE SULFATE 1 MG/ML
0.25 INJECTION SUBCUTANEOUS ONCE AS NEEDED
OUTPATIENT
Start: 2025-02-06

## 2025-02-06 RX ORDER — SODIUM CHLORIDE, SODIUM LACTATE, POTASSIUM CHLORIDE, CALCIUM CHLORIDE 600; 310; 30; 20 MG/100ML; MG/100ML; MG/100ML; MG/100ML
75 INJECTION, SOLUTION INTRAVENOUS CONTINUOUS
OUTPATIENT
Start: 2025-02-06 | End: 2025-02-07

## 2025-02-06 RX ORDER — ONDANSETRON HYDROCHLORIDE 2 MG/ML
4 INJECTION, SOLUTION INTRAVENOUS EVERY 6 HOURS PRN
OUTPATIENT
Start: 2025-02-06

## 2025-02-06 RX ORDER — LOPERAMIDE HYDROCHLORIDE 2 MG/1
4 CAPSULE ORAL EVERY 2 HOUR PRN
OUTPATIENT
Start: 2025-02-06

## 2025-02-06 RX ORDER — HYDRALAZINE HYDROCHLORIDE 20 MG/ML
5 INJECTION INTRAMUSCULAR; INTRAVENOUS ONCE AS NEEDED
OUTPATIENT
Start: 2025-02-06

## 2025-02-06 RX ORDER — NIFEDIPINE 10 MG/1
10 CAPSULE ORAL ONCE AS NEEDED
OUTPATIENT
Start: 2025-02-06

## 2025-02-07 DIAGNOSIS — Z3A.39 39 WEEKS GESTATION OF PREGNANCY (HHS-HCC): Primary | ICD-10-CM

## 2025-02-13 ENCOUNTER — HOSPITAL ENCOUNTER (INPATIENT)
Facility: HOSPITAL | Age: 35
LOS: 3 days | Discharge: HOME | End: 2025-02-16
Attending: OBSTETRICS & GYNECOLOGY | Admitting: ADVANCED PRACTICE MIDWIFE
Payer: COMMERCIAL

## 2025-02-13 ENCOUNTER — APPOINTMENT (OUTPATIENT)
Dept: OBSTETRICS AND GYNECOLOGY | Facility: HOSPITAL | Age: 35
End: 2025-02-13
Payer: COMMERCIAL

## 2025-02-13 ENCOUNTER — ANESTHESIA EVENT (OUTPATIENT)
Dept: OBSTETRICS AND GYNECOLOGY | Facility: HOSPITAL | Age: 35
End: 2025-02-13
Payer: COMMERCIAL

## 2025-02-13 ENCOUNTER — ANESTHESIA (OUTPATIENT)
Dept: OBSTETRICS AND GYNECOLOGY | Facility: HOSPITAL | Age: 35
End: 2025-02-13
Payer: COMMERCIAL

## 2025-02-13 DIAGNOSIS — Z34.90 ENCOUNTER FOR INDUCTION OF LABOR: ICD-10-CM

## 2025-02-13 DIAGNOSIS — Z3A.39 39 WEEKS GESTATION OF PREGNANCY (HHS-HCC): ICD-10-CM

## 2025-02-13 PROBLEM — O09.523 AMA (ADVANCED MATERNAL AGE) MULTIGRAVIDA 35+, THIRD TRIMESTER (HHS-HCC): Status: ACTIVE | Noted: 2025-02-13

## 2025-02-13 PROBLEM — R73.03 PREDIABETES: Status: ACTIVE | Noted: 2024-07-03

## 2025-02-13 LAB
ABO GROUP (TYPE) IN BLOOD: NORMAL
ABO GROUP (TYPE) IN BLOOD: NORMAL
ANTIBODY SCREEN: NORMAL
ERYTHROCYTE [DISTWIDTH] IN BLOOD BY AUTOMATED COUNT: 13.4 % (ref 11.5–14.5)
HCT VFR BLD AUTO: 35.7 % (ref 36–46)
HGB BLD-MCNC: 12.4 G/DL (ref 12–16)
MCH RBC QN AUTO: 31.3 PG (ref 26–34)
MCHC RBC AUTO-ENTMCNC: 34.7 G/DL (ref 32–36)
MCV RBC AUTO: 90 FL (ref 80–100)
NRBC BLD-RTO: 0 /100 WBCS (ref 0–0)
PLATELET # BLD AUTO: 230 X10*3/UL (ref 150–450)
RBC # BLD AUTO: 3.96 X10*6/UL (ref 4–5.2)
RH FACTOR (ANTIGEN D): NORMAL
RH FACTOR (ANTIGEN D): NORMAL
WBC # BLD AUTO: 8.4 X10*3/UL (ref 4.4–11.3)

## 2025-02-13 PROCEDURE — 59025 FETAL NON-STRESS TEST: CPT | Performed by: ADVANCED PRACTICE MIDWIFE

## 2025-02-13 PROCEDURE — 36415 COLL VENOUS BLD VENIPUNCTURE: CPT | Performed by: ADVANCED PRACTICE MIDWIFE

## 2025-02-13 PROCEDURE — 99222 1ST HOSP IP/OBS MODERATE 55: CPT | Performed by: ADVANCED PRACTICE MIDWIFE

## 2025-02-13 PROCEDURE — 86780 TREPONEMA PALLIDUM: CPT | Mod: AHULAB | Performed by: ADVANCED PRACTICE MIDWIFE

## 2025-02-13 PROCEDURE — 86901 BLOOD TYPING SEROLOGIC RH(D): CPT | Performed by: ADVANCED PRACTICE MIDWIFE

## 2025-02-13 PROCEDURE — 1120000001 HC OB PRIVATE ROOM DAILY

## 2025-02-13 PROCEDURE — 85027 COMPLETE CBC AUTOMATED: CPT | Performed by: ADVANCED PRACTICE MIDWIFE

## 2025-02-13 PROCEDURE — 2500000001 HC RX 250 WO HCPCS SELF ADMINISTERED DRUGS (ALT 637 FOR MEDICARE OP): Performed by: ADVANCED PRACTICE MIDWIFE

## 2025-02-13 RX ORDER — MISOPROSTOL 200 UG/1
800 TABLET ORAL ONCE AS NEEDED
Status: DISCONTINUED | OUTPATIENT
Start: 2025-02-13 | End: 2025-02-15

## 2025-02-13 RX ORDER — LIDOCAINE HYDROCHLORIDE 10 MG/ML
30 INJECTION, SOLUTION INFILTRATION; PERINEURAL ONCE AS NEEDED
Status: DISCONTINUED | OUTPATIENT
Start: 2025-02-13 | End: 2025-02-15

## 2025-02-13 RX ORDER — NIFEDIPINE 10 MG/1
10 CAPSULE ORAL ONCE AS NEEDED
Status: DISCONTINUED | OUTPATIENT
Start: 2025-02-13 | End: 2025-02-15

## 2025-02-13 RX ORDER — TERBUTALINE SULFATE 1 MG/ML
0.25 INJECTION SUBCUTANEOUS ONCE AS NEEDED
Status: DISCONTINUED | OUTPATIENT
Start: 2025-02-13 | End: 2025-02-15

## 2025-02-13 RX ORDER — OXYTOCIN/0.9 % SODIUM CHLORIDE 30/500 ML
60 PLASTIC BAG, INJECTION (ML) INTRAVENOUS ONCE AS NEEDED
Status: DISCONTINUED | OUTPATIENT
Start: 2025-02-13 | End: 2025-02-15

## 2025-02-13 RX ORDER — SODIUM CHLORIDE, SODIUM LACTATE, POTASSIUM CHLORIDE, CALCIUM CHLORIDE 600; 310; 30; 20 MG/100ML; MG/100ML; MG/100ML; MG/100ML
75 INJECTION, SOLUTION INTRAVENOUS CONTINUOUS
Status: DISCONTINUED | OUTPATIENT
Start: 2025-02-13 | End: 2025-02-15

## 2025-02-13 RX ORDER — OXYTOCIN 10 [USP'U]/ML
10 INJECTION, SOLUTION INTRAMUSCULAR; INTRAVENOUS ONCE AS NEEDED
Status: DISCONTINUED | OUTPATIENT
Start: 2025-02-13 | End: 2025-02-15

## 2025-02-13 RX ORDER — CARBOPROST TROMETHAMINE 250 UG/ML
250 INJECTION, SOLUTION INTRAMUSCULAR ONCE AS NEEDED
Status: DISCONTINUED | OUTPATIENT
Start: 2025-02-13 | End: 2025-02-15

## 2025-02-13 RX ORDER — LOPERAMIDE HYDROCHLORIDE 2 MG/1
4 CAPSULE ORAL EVERY 2 HOUR PRN
Status: DISCONTINUED | OUTPATIENT
Start: 2025-02-13 | End: 2025-02-15

## 2025-02-13 RX ORDER — ONDANSETRON HYDROCHLORIDE 2 MG/ML
4 INJECTION, SOLUTION INTRAVENOUS EVERY 6 HOURS PRN
Status: DISCONTINUED | OUTPATIENT
Start: 2025-02-13 | End: 2025-02-15

## 2025-02-13 RX ORDER — ONDANSETRON 4 MG/1
4 TABLET, FILM COATED ORAL EVERY 6 HOURS PRN
Status: DISCONTINUED | OUTPATIENT
Start: 2025-02-13 | End: 2025-02-15

## 2025-02-13 RX ORDER — HYDRALAZINE HYDROCHLORIDE 20 MG/ML
5 INJECTION INTRAMUSCULAR; INTRAVENOUS ONCE AS NEEDED
Status: DISCONTINUED | OUTPATIENT
Start: 2025-02-13 | End: 2025-02-15

## 2025-02-13 RX ORDER — LABETALOL HYDROCHLORIDE 5 MG/ML
20 INJECTION, SOLUTION INTRAVENOUS ONCE AS NEEDED
Status: DISCONTINUED | OUTPATIENT
Start: 2025-02-13 | End: 2025-02-15

## 2025-02-13 RX ORDER — METHYLERGONOVINE MALEATE 0.2 MG/ML
0.2 INJECTION INTRAVENOUS ONCE AS NEEDED
Status: DISCONTINUED | OUTPATIENT
Start: 2025-02-13 | End: 2025-02-15

## 2025-02-13 RX ORDER — TRANEXAMIC ACID 100 MG/ML
1000 INJECTION, SOLUTION INTRAVENOUS ONCE AS NEEDED
Status: DISCONTINUED | OUTPATIENT
Start: 2025-02-13 | End: 2025-02-15

## 2025-02-13 RX ADMIN — MISOPROSTOL 25 MCG: 100 TABLET ORAL at 19:55

## 2025-02-13 SDOH — SOCIAL STABILITY: SOCIAL INSECURITY: WITHIN THE LAST YEAR, HAVE YOU BEEN HUMILIATED OR EMOTIONALLY ABUSED IN OTHER WAYS BY YOUR PARTNER OR EX-PARTNER?: NO

## 2025-02-13 SDOH — SOCIAL STABILITY: SOCIAL INSECURITY: DOES ANYONE TRY TO KEEP YOU FROM HAVING/CONTACTING OTHER FRIENDS OR DOING THINGS OUTSIDE YOUR HOME?: NO

## 2025-02-13 SDOH — SOCIAL STABILITY: SOCIAL INSECURITY: ARE YOU OR HAVE YOU BEEN THREATENED OR ABUSED PHYSICALLY, EMOTIONALLY, OR SEXUALLY BY ANYONE?: NO

## 2025-02-13 SDOH — SOCIAL STABILITY: SOCIAL INSECURITY: WITHIN THE LAST YEAR, HAVE YOU BEEN AFRAID OF YOUR PARTNER OR EX-PARTNER?: NO

## 2025-02-13 SDOH — ECONOMIC STABILITY: FOOD INSECURITY: WITHIN THE PAST 12 MONTHS, YOU WORRIED THAT YOUR FOOD WOULD RUN OUT BEFORE YOU GOT THE MONEY TO BUY MORE.: NEVER TRUE

## 2025-02-13 SDOH — ECONOMIC STABILITY: TRANSPORTATION INSECURITY: IN THE PAST 12 MONTHS, HAS LACK OF TRANSPORTATION KEPT YOU FROM MEDICAL APPOINTMENTS OR FROM GETTING MEDICATIONS?: NO

## 2025-02-13 SDOH — SOCIAL STABILITY: SOCIAL INSECURITY: HAVE YOU HAD ANY THOUGHTS OF HARMING ANYONE ELSE?: NO

## 2025-02-13 SDOH — HEALTH STABILITY: MENTAL HEALTH: HAVE YOU USED ANY SUBSTANCES (CANABIS, COCAINE, HEROIN, HALLUCINOGENS, INHALANTS, ETC.) IN THE PAST 12 MONTHS?: NO

## 2025-02-13 SDOH — SOCIAL STABILITY: SOCIAL INSECURITY
WITHIN THE LAST YEAR, HAVE YOU BEEN RAPED OR FORCED TO HAVE ANY KIND OF SEXUAL ACTIVITY BY YOUR PARTNER OR EX-PARTNER?: NO

## 2025-02-13 SDOH — ECONOMIC STABILITY: HOUSING INSECURITY: DO YOU FEEL UNSAFE GOING BACK TO THE PLACE WHERE YOU ARE LIVING?: NO

## 2025-02-13 SDOH — SOCIAL STABILITY: SOCIAL INSECURITY
WITHIN THE LAST YEAR, HAVE YOU BEEN KICKED, HIT, SLAPPED, OR OTHERWISE PHYSICALLY HURT BY YOUR PARTNER OR EX-PARTNER?: NO

## 2025-02-13 SDOH — SOCIAL STABILITY: SOCIAL INSECURITY: PHYSICAL ABUSE: DENIES

## 2025-02-13 SDOH — SOCIAL STABILITY: SOCIAL INSECURITY: ARE THERE ANY APPARENT SIGNS OF INJURIES/BEHAVIORS THAT COULD BE RELATED TO ABUSE/NEGLECT?: NO

## 2025-02-13 SDOH — SOCIAL STABILITY: SOCIAL INSECURITY: VERBAL ABUSE: DENIES

## 2025-02-13 SDOH — HEALTH STABILITY: MENTAL HEALTH: CURRENT SMOKER: 0

## 2025-02-13 SDOH — SOCIAL STABILITY: SOCIAL INSECURITY: DO YOU FEEL ANYONE HAS EXPLOITED OR TAKEN ADVANTAGE OF YOU FINANCIALLY OR OF YOUR PERSONAL PROPERTY?: NO

## 2025-02-13 SDOH — ECONOMIC STABILITY: FOOD INSECURITY: HOW HARD IS IT FOR YOU TO PAY FOR THE VERY BASICS LIKE FOOD, HOUSING, MEDICAL CARE, AND HEATING?: NOT HARD AT ALL

## 2025-02-13 SDOH — SOCIAL STABILITY: SOCIAL INSECURITY: ABUSE SCREEN: ADULT

## 2025-02-13 SDOH — HEALTH STABILITY: MENTAL HEALTH: WISH TO BE DEAD (PAST 1 MONTH): NO

## 2025-02-13 SDOH — ECONOMIC STABILITY: FOOD INSECURITY: WITHIN THE PAST 12 MONTHS, THE FOOD YOU BOUGHT JUST DIDN'T LAST AND YOU DIDN'T HAVE MONEY TO GET MORE.: NEVER TRUE

## 2025-02-13 SDOH — HEALTH STABILITY: MENTAL HEALTH: SUICIDAL BEHAVIOR (LIFETIME): NO

## 2025-02-13 SDOH — HEALTH STABILITY: MENTAL HEALTH: HAVE YOU USED ANY PRESCRIPTION DRUGS OTHER THAN PRESCRIBED IN THE PAST 12 MONTHS?: NO

## 2025-02-13 SDOH — SOCIAL STABILITY: SOCIAL INSECURITY: HAS ANYONE EVER THREATENED TO HURT YOUR FAMILY OR YOUR PETS?: NO

## 2025-02-13 SDOH — SOCIAL STABILITY: SOCIAL INSECURITY: HAVE YOU HAD THOUGHTS OF HARMING ANYONE ELSE?: NO

## 2025-02-13 SDOH — HEALTH STABILITY: MENTAL HEALTH: NON-SPECIFIC ACTIVE SUICIDAL THOUGHTS (PAST 1 MONTH): NO

## 2025-02-13 SDOH — HEALTH STABILITY: MENTAL HEALTH: WERE YOU ABLE TO COMPLETE ALL THE BEHAVIORAL HEALTH SCREENINGS?: YES

## 2025-02-13 ASSESSMENT — LIFESTYLE VARIABLES
HOW OFTEN DO YOU HAVE A DRINK CONTAINING ALCOHOL: NEVER
AUDIT-C TOTAL SCORE: 0
SKIP TO QUESTIONS 9-10: 1
HOW OFTEN DO YOU HAVE 6 OR MORE DRINKS ON ONE OCCASION: NEVER
AUDIT-C TOTAL SCORE: 0
HOW MANY STANDARD DRINKS CONTAINING ALCOHOL DO YOU HAVE ON A TYPICAL DAY: PATIENT DOES NOT DRINK

## 2025-02-13 ASSESSMENT — ACTIVITIES OF DAILY LIVING (ADL)
LACK_OF_TRANSPORTATION: NO
LACK_OF_TRANSPORTATION: NO

## 2025-02-13 ASSESSMENT — PAIN SCALES - GENERAL
PAINLEVEL_OUTOF10: 0 - NO PAIN

## 2025-02-13 ASSESSMENT — PATIENT HEALTH QUESTIONNAIRE - PHQ9
2. FEELING DOWN, DEPRESSED OR HOPELESS: NOT AT ALL
SUM OF ALL RESPONSES TO PHQ9 QUESTIONS 1 & 2: 0
1. LITTLE INTEREST OR PLEASURE IN DOING THINGS: NOT AT ALL

## 2025-02-13 NOTE — ANESTHESIA PREPROCEDURE EVALUATION
"Patient: Geraldine Gaines \"Heena\"    Evaluation Method: Chart review    Procedure Information    Date: 02/13/25  Procedure: Labor Analgesia         Relevant Problems   ID   (+) Genital herpes simplex   (+) HSV (herpes simplex virus) anogenital infection      GYN   (+) 38 weeks gestation of pregnancy ([facility])   (+) AMA (advanced maternal age) multigravida 35+, third trimester ([facility])   (+) Antepartum multigravida of advanced maternal age ([facility])       Clinical information reviewed:   Tobacco  Allergies  Meds   Med Hx  Surg Hx   Fam Hx          NPO Detail:  No data recorded     OB/GYN     Physical Exam    Airway  Mallampati: II  TM distance: >3 FB  Neck ROM: full     Cardiovascular   Rhythm: regular  Rate: normal     Dental - normal exam     Pulmonary    Abdominal            Anesthesia Plan    History of general anesthesia?: yes  History of complications of general anesthesia?: no    ASA 2     epidural     The patient is not a current smoker.  Patient was not previously instructed to abstain from smoking on day of procedure.  Patient did not smoke on day of procedure.  Education provided regarding risk of obstructive sleep apnea.  Anesthetic plan and risks discussed with patient.  Use of blood products discussed with patient who.        "

## 2025-02-14 LAB — TREPONEMA PALLIDUM IGG+IGM AB [PRESENCE] IN SERUM OR PLASMA BY IMMUNOASSAY: NONREACTIVE

## 2025-02-14 PROCEDURE — 10H07YZ INSERTION OF OTHER DEVICE INTO PRODUCTS OF CONCEPTION, VIA NATURAL OR ARTIFICIAL OPENING: ICD-10-PCS | Performed by: ADVANCED PRACTICE MIDWIFE

## 2025-02-14 PROCEDURE — 2500000004 HC RX 250 GENERAL PHARMACY W/ HCPCS (ALT 636 FOR OP/ED): Performed by: NURSE ANESTHETIST, CERTIFIED REGISTERED

## 2025-02-14 PROCEDURE — 51701 INSERT BLADDER CATHETER: CPT

## 2025-02-14 PROCEDURE — 3E033VJ INTRODUCTION OF OTHER HORMONE INTO PERIPHERAL VEIN, PERCUTANEOUS APPROACH: ICD-10-PCS | Performed by: ADVANCED PRACTICE MIDWIFE

## 2025-02-14 PROCEDURE — 10907ZC DRAINAGE OF AMNIOTIC FLUID, THERAPEUTIC FROM PRODUCTS OF CONCEPTION, VIA NATURAL OR ARTIFICIAL OPENING: ICD-10-PCS | Performed by: ADVANCED PRACTICE MIDWIFE

## 2025-02-14 PROCEDURE — 7210000002 HC LABOR PER HOUR

## 2025-02-14 PROCEDURE — 2500000005 HC RX 250 GENERAL PHARMACY W/O HCPCS: Performed by: NURSE ANESTHETIST, CERTIFIED REGISTERED

## 2025-02-14 PROCEDURE — 3700000014 EPIDURAL BLOCK: Performed by: NURSE ANESTHETIST, CERTIFIED REGISTERED

## 2025-02-14 PROCEDURE — 2500000001 HC RX 250 WO HCPCS SELF ADMINISTERED DRUGS (ALT 637 FOR MEDICARE OP): Performed by: ADVANCED PRACTICE MIDWIFE

## 2025-02-14 PROCEDURE — 1120000001 HC OB PRIVATE ROOM DAILY

## 2025-02-14 PROCEDURE — 2500000004 HC RX 250 GENERAL PHARMACY W/ HCPCS (ALT 636 FOR OP/ED): Performed by: ADVANCED PRACTICE MIDWIFE

## 2025-02-14 PROCEDURE — 59050 FETAL MONITOR W/REPORT: CPT

## 2025-02-14 PROCEDURE — 3E0P7VZ INTRODUCTION OF HORMONE INTO FEMALE REPRODUCTIVE, VIA NATURAL OR ARTIFICIAL OPENING: ICD-10-PCS | Performed by: ADVANCED PRACTICE MIDWIFE

## 2025-02-14 PROCEDURE — 59409 OBSTETRICAL CARE: CPT | Performed by: ADVANCED PRACTICE MIDWIFE

## 2025-02-14 RX ORDER — NORETHINDRONE AND ETHINYL ESTRADIOL 0.5-0.035
KIT ORAL AS NEEDED
Status: DISCONTINUED | OUTPATIENT
Start: 2025-02-14 | End: 2025-02-14

## 2025-02-14 RX ORDER — NALBUPHINE HYDROCHLORIDE 10 MG/ML
10 INJECTION INTRAMUSCULAR; INTRAVENOUS; SUBCUTANEOUS
Status: DISCONTINUED | OUTPATIENT
Start: 2025-02-14 | End: 2025-02-15

## 2025-02-14 RX ORDER — OXYTOCIN/0.9 % SODIUM CHLORIDE 30/500 ML
2-30 PLASTIC BAG, INJECTION (ML) INTRAVENOUS CONTINUOUS
Status: DISCONTINUED | OUTPATIENT
Start: 2025-02-14 | End: 2025-02-15

## 2025-02-14 RX ORDER — PHENYLEPHRINE HCL IN 0.9% NACL 1 MG/10 ML
SYRINGE (ML) INTRAVENOUS AS NEEDED
Status: DISCONTINUED | OUTPATIENT
Start: 2025-02-14 | End: 2025-02-14

## 2025-02-14 RX ORDER — FENTANYL/ROPIVACAINE/NS/PF 2MCG/ML-.2
0-25 PLASTIC BAG, INJECTION (ML) INJECTION CONTINUOUS
Status: DISCONTINUED | OUTPATIENT
Start: 2025-02-14 | End: 2025-02-15

## 2025-02-14 RX ORDER — ALUMINUM HYDROXIDE, MAGNESIUM HYDROXIDE, AND SIMETHICONE 1200; 120; 1200 MG/30ML; MG/30ML; MG/30ML
20 SUSPENSION ORAL 4 TIMES DAILY PRN
Status: DISCONTINUED | OUTPATIENT
Start: 2025-02-14 | End: 2025-02-15

## 2025-02-14 RX ORDER — LIDOCAINE HYDROCHLORIDE AND EPINEPHRINE 15; 5 MG/ML; UG/ML
INJECTION, SOLUTION EPIDURAL AS NEEDED
Status: DISCONTINUED | OUTPATIENT
Start: 2025-02-14 | End: 2025-02-14

## 2025-02-14 RX ADMIN — Medication 8 ML/HR: at 12:53

## 2025-02-14 RX ADMIN — SODIUM CHLORIDE, POTASSIUM CHLORIDE, SODIUM LACTATE AND CALCIUM CHLORIDE 500 ML: 600; 310; 30; 20 INJECTION, SOLUTION INTRAVENOUS at 13:00

## 2025-02-14 RX ADMIN — Medication 100 MCG: at 13:23

## 2025-02-14 RX ADMIN — ALUMINUM HYDROXIDE, MAGNESIUM HYDROXIDE, AND SIMETHICONE 20 ML: 1200; 120; 1200 SUSPENSION ORAL at 21:07

## 2025-02-14 RX ADMIN — NALBUPHINE HYDROCHLORIDE 10 MG: 10 INJECTION, SOLUTION INTRAMUSCULAR; INTRAVENOUS; SUBCUTANEOUS at 03:30

## 2025-02-14 RX ADMIN — SODIUM CHLORIDE, POTASSIUM CHLORIDE, SODIUM LACTATE AND CALCIUM CHLORIDE 75 ML/HR: 600; 310; 30; 20 INJECTION, SOLUTION INTRAVENOUS at 21:08

## 2025-02-14 RX ADMIN — SODIUM CHLORIDE, POTASSIUM CHLORIDE, SODIUM LACTATE AND CALCIUM CHLORIDE 500 ML: 600; 310; 30; 20 INJECTION, SOLUTION INTRAVENOUS at 13:27

## 2025-02-14 RX ADMIN — LIDOCAINE HYDROCHLORIDE,EPINEPHRINE BITARTRATE 2 ML: 15; .005 INJECTION, SOLUTION EPIDURAL; INFILTRATION; INTRACAUDAL; PERINEURAL at 12:48

## 2025-02-14 RX ADMIN — NALBUPHINE HYDROCHLORIDE 10 MG: 10 INJECTION, SOLUTION INTRAMUSCULAR; INTRAVENOUS; SUBCUTANEOUS at 10:50

## 2025-02-14 RX ADMIN — Medication 2 MILLI-UNITS/MIN: at 05:44

## 2025-02-14 RX ADMIN — SODIUM CHLORIDE, POTASSIUM CHLORIDE, SODIUM LACTATE AND CALCIUM CHLORIDE 75 ML/HR: 600; 310; 30; 20 INJECTION, SOLUTION INTRAVENOUS at 05:43

## 2025-02-14 RX ADMIN — SODIUM CHLORIDE, POTASSIUM CHLORIDE, SODIUM LACTATE AND CALCIUM CHLORIDE 75 ML/HR: 600; 310; 30; 20 INJECTION, SOLUTION INTRAVENOUS at 13:54

## 2025-02-14 RX ADMIN — EPHEDRINE SULFATE 50 MG: 50 INJECTION, SOLUTION INTRAVENOUS at 14:02

## 2025-02-14 RX ADMIN — LIDOCAINE HYDROCHLORIDE,EPINEPHRINE BITARTRATE 3 ML: 15; .005 INJECTION, SOLUTION EPIDURAL; INFILTRATION; INTRACAUDAL; PERINEURAL at 12:46

## 2025-02-14 ASSESSMENT — PAIN SCALES - GENERAL
PAINLEVEL_OUTOF10: 0 - NO PAIN
PAINLEVEL_OUTOF10: 5 - MODERATE PAIN
PAINLEVEL_OUTOF10: 0 - NO PAIN
PAIN_LEVEL: 0
PAINLEVEL_OUTOF10: 0 - NO PAIN
PAINLEVEL_OUTOF10: 9

## 2025-02-14 NOTE — PROGRESS NOTES
S:  Coping well s/p nubain.  Ctxs spaced.  O:  VSS  125, mod variability, no accels, no decels.  Cervical exam deferred  Ctxs Q 2-6 min, coupling  A:  IOL 40wk  Latent labor s/p crb, cyto x1, now spacing ctxs  Cat I FHT  GBS neg  AMA  HSV neg spec  BMI 37  P:  Initiate pitocin IOL per protocol.  Pt aware of r/b/a.  Continuous monitoring  Frequent position changes to optimize fetal position  Epidural/nubain prn  Reassess as indicated  Anticipate SVB  Dr. Lopez updated on pt status and plan.    Chel Viramontes, MINA-NAYELY

## 2025-02-14 NOTE — ANESTHESIA PROCEDURE NOTES
Epidural Block    Patient location during procedure: OB  Start time: 2/14/2025 12:30 PM  End time: 2/14/2025 12:55 PM  Reason for block: labor analgesia  Staffing  Performed: CRNA   Authorized by: DOMINGUEZ Owens    Performed by: DOMINGUEZ Owens    Preanesthetic Checklist  Completed: patient identified, IV checked, risks and benefits discussed, surgical consent, pre-op evaluation, timeout performed and sterile techniques followed  Block Timeout  RN/Licensed healthcare professional reads aloud to the Anesthesia provider and entire team: Patient identity, procedure with side and site, patient position, and as applicable the availability of implants/special equipment/special requirements.  Patient on coagulant treatment: no  Timeout performed at: 2/14/2025 12:33 PM  Block Placement  Patient position: sitting  Prep: ChloraPrep  Sterility prep: cap, drape, gloves, hand and mask  Sedation level: no sedation  Patient monitoring: blood pressure and continuous pulse oximetry  Approach: midline  Local numbing: lidocaine 1% to skin and subcutaneous tissues  Vertebral space: lumbar  Lumbar location: L4-L5  Epidural  Loss of resistance technique: saline  Guidance: landmark technique        Needle  Needle type: Tuohy   Needle gauge: 17  Needle length: 8.9cm  Needle insertion depth: 10 cm  Catheter type: none  Catheter size: 19 G  Catheter at skin depth: 15 cm  Catheter securement method: clear occlusive dressing, liquid medical adhesive, stabilization device and surgical tape    Test dose: lidocaine 1.5% with epinephrine 1-to-200,000  Test dose: lidocaine 1.5% with epinephrine 1-to-200,000  Test dose result: no positive test dose    PCEA  Medication concentration used: 0.2% Ropivacaine with 2 mcg/mL Fentanyl  Dose (mL): 5  Lockout (minutes): 20  1-Hour Limit (boluses/hr): 3  Basal Rate: 8        Assessment  Sensory level: T10 bilateral  Block outcome: patient comfortable  Number of attempts: 1  Events: no  positive test dose  Procedure assessment: patient tolerated procedure well with no immediate complications  Additional Notes  1% lido localization. Neg heme, neg csf, neg paresthesia. RAHEEL after supine.

## 2025-02-14 NOTE — PROGRESS NOTES
[facility] LABOR PROGRESS NOTE    Subjective:    Patient is doing well with contractions.   Partner is supportive and present at the bedside.    Contraction pain is mild, denies need for pain intervention at this time.   Epidural:  no    Objective:   Visit Vitals  BP 90/53   Pulse 82   Temp 36.6 °C (97.9 °F) (Oral)   Resp 17       FHR:      Baseline 140  Variability; moderate  Accels; Reactive  Decels none                                      Contraction Frequency:  irregular, every 1-5 minutes    Cervical Exam: defereed    Membranes:  are intact    Pitocin:  Yes  6milliunits/minute    Assessment:  Geraldine Gaines is a 34 y.o.  at 40w1d  IOL: RR-AMA  Latent Labor  HSV: neg SSE  Poor fetal growth: Most recent growth sono with appropriate interval growth and EFW 22%  H/O abuse  Fetal Heart Rate Category 1 - overall reassuring  GBS: negative    Plan:  Cont pitocin IOL per protocol  Consider AROM with next exam  Anticipate active labor  Epidural PRN  Activity as tolerated  Reg diet  Encouraged to rest  Frequent position changes  Continuous fetal monitoring  Continue pitocin and titrate as needed to achieve adequate contraction pattern  Re-evaluate in 2 hours or as needed  Reviewed with Dr Hansen who agreed with plan of care    Electronically signed by RUSSEL Badillo on 2025 at 8:05 AM

## 2025-02-14 NOTE — SIGNIFICANT EVENT
S:   minimal cramping with CRB in place.  O:   VSS  cat I FHR  irregular ctxs Q10 min, no irritability now  A:   IOL 40wk, crb in place  P:   Continue crb  cytotec 25mcg vaginally now after review of r/b/a w pt  Reassess as indicated  Dr. Lopez updated on pt status and plan    Chel Viramontes, MINA-JOHNATHAN

## 2025-02-14 NOTE — NURSING NOTE
1935: RN at bedside attempting to get baby on external FHR monitor. Fetal movement audible on monitor.   1953: RN at bedside, pt off external FHR monitor - up to bathroom, switching over to wireless FHR monitor.   2126: Pt up to bathroom, wireless FHR monitor tracing external.   0315: RN at bedside, pt requesting IV pain medication. Ana M ADLER notified.

## 2025-02-14 NOTE — H&P
OB Admission H&P    Assessment/Plan    Geraldine Gaines is a 34 y.o.  at 40w0d, NATE: 2025, by Last Menstrual Period, who presents for Induction of Labor.    Pregnancy notable for:  BMI 37, EFW 22%, previously FGR now resolved  AMA 35yo  SMA carrier, s/p genetics counseling, no additional testing  HSV neg spec now on admission, on prophylaxis  Prediabetes, normal 1hr glucose 28wks     Plan  -Admit to L&D, consented  -Spec negative for HSV lesions  -T&S, CBC, and Syphilis, verify ABO  -Epidural at patient request  -Recheck as clinically indicated by maternal or fetal status  -Plan to initiate induction with crb, consider cytotec prn if uterine irritability slows    Fetal Status  -NST reactive, reassuring   -Presentation cephalic based on BSUS ultrasound  -EFW 7.5# by clinical assessment c/w 36wk ultrasound 22%  -GBS negative    Postpartum  Contraception Plan: patient declined    Dr. Lopez updated on pt admission, status, and plan of care.    38w6d Problems (from 24 to present)       Problem Noted Diagnosed Resolved    Encounter for induction of labor 2025 by RUSSEL Hahn  No    Priority:  Medium       AMA (advanced maternal age) multigravida 35+, third trimester ([facility]) 2025 by RUSSEL Hahn  No    Priority:  Medium       Poor fetal growth affecting management of mother, antepartum ([facility]) 2024 by RUSSEL Badillo  No    Priority:  Medium       Overview Addendum 2025  7:37 PM by RUSSEL Badillo     24: Decreased interval fetal growth at 30 weeks. The Ac is at the 2%, and the EFW is at the 7% percentile Repeat doppler, BPP and AFV evaluation scheduled weekly per [facility]. SD  24: at 33 weeks EFW 16th% with AC at 17th%. SD  25: 36 week sono: Appropriate fetal growth: 2526 g at the 22%, AC 15%, normal AVELINO. Continue weekly NST per [place] protocol. SD         38 weeks gestation of pregnancy ([facility]) 2024 by Kacey NUÑEZ  RUSSEL Forte  No    Priority:  Medium       Overview Addendum 2/5/2025 10:09 AM by Charity De Luna     9/11/2024: Desired provider in labor: [x] CNM  [x] Physician   [x] Initial BMI: >40  [x] Prenatal Labs: Done at [facility]   [x] Rh status: positive  [x] Genetic Screening:  Increased risk for SMA but declines additional testing, NIPT not done   [x] Baby ASA  [x] Dating confirmation with US    [x] Anatomy US: ordered   [] Patient added to BirthTracks: Doc pt   [x] 1hr GCT at 24-28wks: normal   [] 3 hr GTT (if indicated):  [x] Rhogam (if indicated): A pos   [x] Fetal Surveillance (if indicated):weekly BPP, weekly dopplers, serial growth     [x] Tdap (27-36wks): at 1/22/25   [] RSV (32-36wks) considering   [] Flu Shot:  [x] COVID vaccine: declines     [x] Breastfeeding  [] Pain management during labor:   [x] Postpartum Birth control method: withdrawl/condoms     [x] GBS at 36 wks: 1/22/25  [x] 39 weeks discussion of IOL vs. Expectant management: pt requested 40 weeks scheduled IOL  [x] Mode of delivery: NCB             Elevated hemoglobin A1c 9/11/2024 by RUSSEL Badillo  No    Priority:  Medium       Overview Addendum 9/11/2024  2:31 PM by RUSSEL Shaikh     9/11/2024: 6.2 at NOB. 3 hr gct normal SC           Maternal obesity syndrome, antepartum ([facility]) 9/11/2024 by RUSSEL Badillo  No    Priority:  Medium       Overview Signed 9/11/2024  1:50 PM by RUSSEL Shaikh     9/11/2024: BMI = >40 at NOB    Fetal surveillance BMI >40 at NOB:  Growth US at 30 and 36 wks  BPP or NST weekly 34 wks to delivery    Timing of delivery: 39 0/7 - 39 6/7 wks  *BMI >= 50 at any time in pregnancy: Deliver at Level 4             Abnormal genetic test 9/11/2024 by RUSSEL Badillo  No    Priority:  Medium       Overview Signed 9/11/2024  1:50 PM by RUSSEL Shaikh     9/11/2024: Increased risk for SMA carrier status. Genetic consult complete. SC           Migraines 9/11/2024  by RUSSEL Badillo  No    Priority:  Medium       Overview Signed 2024  1:59 PM by RUSSEL Shaikh     2024: Tylenol PRN           FH: ovarian cancer 2024 by RUSSEL Badillo  No    Priority:  Medium       Overview Signed 2024  2:34 PM by RUSSEL Shaikh     2024: Maternal aunt passed away at age 44 from ovarian cancer. Pt mother is a carrier and had a hysterectomy. Pt has not yet been tested. Will plan My Risk postpartum. SC           Ovarian cancer (Multi) 2024 by RUSSEL Badillo  2024 by RUSSEL Badillo    Overview Signed 2024  2:33 PM by RUSSEL Shaikh     .td                 Subjective   Good fetal movement.  Denies vaginal bleeding., Denies contractions., Denies leaking of fluid.      Prenatal Provider Kacey Forte    OB History    Para Term  AB Living   2 0 0 0 1 0   SAB IAB Ectopic Multiple Live Births   1 0 0 0 0      # Outcome Date GA Lbr Kashif/2nd Weight Sex Type Anes PTL Lv   2 Current            1 SAB 10/17/18 7w0d              Past Surgical History:   Procedure Laterality Date    OTHER SURGICAL HISTORY  2021    Tonsillectomy    OTHER SURGICAL HISTORY  2021    Hysteroscopy    OTHER SURGICAL HISTORY  2021    Uterine polypectomy    OTHER SURGICAL HISTORY  2021    Endometrial biopsy       Social History     Tobacco Use    Smoking status: Never    Smokeless tobacco: Never   Substance Use Topics    Alcohol use: Never       Allergies   Allergen Reactions    Lemon Swelling       Medications Prior to Admission   Medication Sig Dispense Refill Last Dose/Taking    ondansetron (Zofran) 4 mg tablet Take 1 tablet (4 mg) by mouth every 8 hours if needed for nausea or vomiting. 30 tablet 1 Past Week    PNV no.163-iron-folate no.10 20 mg iron- 1 mg tablet Take 1 tablet by mouth once daily.   2025    valACYclovir (Valtrex) 500 mg tablet Take 1 tablet (500 mg) by  mouth 2 times a day. 120 tablet 1 2025    acyclovir (Zovirax) 200 mg capsule Take by mouth. (Patient not taking: Reported on 2025)   More than a month    [] terconazole (Terazol 3) 0.8 % vaginal cream Insert 1 applicator into the vagina once daily at bedtime for 3 days. 20 g 0      Objective     Last Vitals  Temp Pulse Resp BP MAP O2 Sat   36.8 °C (98.2 °F) 99 17 119/67 87 97 %     Blood Pressures         2025  1859             BP: 119/67             Physical Exam  General: NAD, mood appropriate  Cardiopulmonary: warm and well perfused, breathing comfortably on room air  Abdomen: Gravid, non-tender  Extremities: Symmetric  Speculum Exam:  no lesions noted on vulva, perineum, vagina, cervix  Cervix: Fingertip /60 /-2     Chaperone Present: Yes.  Chaperone Name/Title: ASHLEY Colon   Examination Chaperoned: Gynecological Exam     Fetal Monitoring  Baseline: 135 bpm, Variability: moderate,  Accelerations: present and Decelerations: none  Uterine Activity: Irregular contractions, uterine irritability  Interpretation: Category I    Bedside ultrasound: Yes    Labs in chart were reviewed.          Prenatal labs reviewed, remarkable for SMA carrier.

## 2025-02-14 NOTE — SIGNIFICANT EVENT
CRB out spontaneously.  Category I FHT, continues to contract spontaneously now.  Will initiate pitocin per protocol if ctxs space.  Dr. Lopez aware.    MINA Hahn-NAYELY
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
## 2025-02-14 NOTE — PROGRESS NOTES
[facility] LABOR PROGRESS NOTE  SUBJECTIVE:    Patient is coping well.  Reports increased cramping with crb in place over the last hour.  Using nitrous oxide now, in hands and knees in bed with good relief.    OBJECTIVE:   Visit Vitals  /67   Pulse 99   Temp 36.8 °C (98.2 °F) (Oral)   Resp 17       FHR:      Baseline: 130  Variability: moderate  Accels: present  Decels: none                                      Contraction Frequency:  regular, every 2-3 minutes    Cervical Exam:  Deferred, CRB remains in place with gentle traction    Membranes:  intact    Pitocin:  No     ASSESSMENT:  Geraldine Gaines is a 34 y.o.  at 40w0d  Unfavorable cervix, crb in place, s/p cyto x 1  Category I FHR  GBS neg  Membranes intact  AMA  HSV neg spec  BMI 37 EFW 22%    PLAN:  Labor mgmt: Continue crb and cytotec per protocol for cervical ripening, consider pitocin if ctxs space rather than additional cytotec.  Activity as tolerated  Clear liquids  Frequent position changes  Continuous fetal monitoring  Pain mgmt as needed, continue nitrous per pt preference now  Reassess as indicated  Anticipate SVB  Dr. Lopez updated on pt status and plan of care    Electronically signed by RUSSEL Hahn on 2025 at 10:42 PM

## 2025-02-14 NOTE — PROGRESS NOTES
S: Beginning to feel more uncomfortable with contractions. Requests Nubain   O: SVE: 5/60/-2, AROM for small amount of clear amniotic fluid, Cat 1 FHR  A/P: IOL: S/P Cytotec, CRB, pitocin, AROM  Latent Labor  Continue pitocin IOL per protocol   Epidural PRN    Kacey Forte, APRN-CNM

## 2025-02-15 PROCEDURE — 2500000005 HC RX 250 GENERAL PHARMACY W/O HCPCS: Performed by: ADVANCED PRACTICE MIDWIFE

## 2025-02-15 PROCEDURE — 1100000001 HC PRIVATE ROOM DAILY

## 2025-02-15 PROCEDURE — 2500000004 HC RX 250 GENERAL PHARMACY W/ HCPCS (ALT 636 FOR OP/ED): Performed by: ADVANCED PRACTICE MIDWIFE

## 2025-02-15 PROCEDURE — 2500000005 HC RX 250 GENERAL PHARMACY W/O HCPCS: Performed by: OBSTETRICS & GYNECOLOGY

## 2025-02-15 PROCEDURE — 2500000001 HC RX 250 WO HCPCS SELF ADMINISTERED DRUGS (ALT 637 FOR MEDICARE OP): Performed by: OBSTETRICS & GYNECOLOGY

## 2025-02-15 PROCEDURE — 2500000001 HC RX 250 WO HCPCS SELF ADMINISTERED DRUGS (ALT 637 FOR MEDICARE OP): Performed by: ADVANCED PRACTICE MIDWIFE

## 2025-02-15 PROCEDURE — 7100000016 HC LABOR RECOVERY PER HOUR

## 2025-02-15 RX ORDER — ENOXAPARIN SODIUM 100 MG/ML
40 INJECTION SUBCUTANEOUS EVERY 24 HOURS
Status: DISCONTINUED | OUTPATIENT
Start: 2025-02-15 | End: 2025-02-16 | Stop reason: HOSPADM

## 2025-02-15 RX ORDER — POLYETHYLENE GLYCOL 3350 17 G/17G
17 POWDER, FOR SOLUTION ORAL 2 TIMES DAILY PRN
Status: DISCONTINUED | OUTPATIENT
Start: 2025-02-15 | End: 2025-02-16 | Stop reason: HOSPADM

## 2025-02-15 RX ORDER — DIPHENHYDRAMINE HYDROCHLORIDE 50 MG/ML
25 INJECTION INTRAMUSCULAR; INTRAVENOUS EVERY 6 HOURS PRN
Status: DISCONTINUED | OUTPATIENT
Start: 2025-02-15 | End: 2025-02-16 | Stop reason: HOSPADM

## 2025-02-15 RX ORDER — OXYTOCIN/0.9 % SODIUM CHLORIDE 30/500 ML
60 PLASTIC BAG, INJECTION (ML) INTRAVENOUS ONCE AS NEEDED
Status: DISCONTINUED | OUTPATIENT
Start: 2025-02-15 | End: 2025-02-16 | Stop reason: HOSPADM

## 2025-02-15 RX ORDER — OXYTOCIN 10 [USP'U]/ML
10 INJECTION, SOLUTION INTRAMUSCULAR; INTRAVENOUS ONCE AS NEEDED
Status: DISCONTINUED | OUTPATIENT
Start: 2025-02-15 | End: 2025-02-16 | Stop reason: HOSPADM

## 2025-02-15 RX ORDER — LOPERAMIDE HYDROCHLORIDE 2 MG/1
4 CAPSULE ORAL EVERY 2 HOUR PRN
Status: DISCONTINUED | OUTPATIENT
Start: 2025-02-15 | End: 2025-02-16 | Stop reason: HOSPADM

## 2025-02-15 RX ORDER — ADHESIVE BANDAGE
10 BANDAGE TOPICAL
Status: DISCONTINUED | OUTPATIENT
Start: 2025-02-15 | End: 2025-02-16 | Stop reason: HOSPADM

## 2025-02-15 RX ORDER — HYDRALAZINE HYDROCHLORIDE 20 MG/ML
5 INJECTION INTRAMUSCULAR; INTRAVENOUS ONCE AS NEEDED
Status: DISCONTINUED | OUTPATIENT
Start: 2025-02-15 | End: 2025-02-16 | Stop reason: HOSPADM

## 2025-02-15 RX ORDER — ONDANSETRON HYDROCHLORIDE 2 MG/ML
4 INJECTION, SOLUTION INTRAVENOUS EVERY 6 HOURS PRN
Status: DISCONTINUED | OUTPATIENT
Start: 2025-02-15 | End: 2025-02-16 | Stop reason: HOSPADM

## 2025-02-15 RX ORDER — ONDANSETRON 4 MG/1
4 TABLET, FILM COATED ORAL EVERY 6 HOURS PRN
Status: DISCONTINUED | OUTPATIENT
Start: 2025-02-15 | End: 2025-02-16 | Stop reason: HOSPADM

## 2025-02-15 RX ORDER — LABETALOL HYDROCHLORIDE 5 MG/ML
20 INJECTION, SOLUTION INTRAVENOUS ONCE AS NEEDED
Status: DISCONTINUED | OUTPATIENT
Start: 2025-02-15 | End: 2025-02-16 | Stop reason: HOSPADM

## 2025-02-15 RX ORDER — CARBOPROST TROMETHAMINE 250 UG/ML
250 INJECTION, SOLUTION INTRAMUSCULAR ONCE AS NEEDED
Status: DISCONTINUED | OUTPATIENT
Start: 2025-02-15 | End: 2025-02-16 | Stop reason: HOSPADM

## 2025-02-15 RX ORDER — METHYLERGONOVINE MALEATE 0.2 MG/ML
0.2 INJECTION INTRAVENOUS ONCE AS NEEDED
Status: DISCONTINUED | OUTPATIENT
Start: 2025-02-15 | End: 2025-02-16 | Stop reason: HOSPADM

## 2025-02-15 RX ORDER — IBUPROFEN 600 MG/1
600 TABLET ORAL EVERY 6 HOURS
Status: DISCONTINUED | OUTPATIENT
Start: 2025-02-15 | End: 2025-02-16 | Stop reason: HOSPADM

## 2025-02-15 RX ORDER — SIMETHICONE 80 MG
80 TABLET,CHEWABLE ORAL 4 TIMES DAILY PRN
Status: DISCONTINUED | OUTPATIENT
Start: 2025-02-15 | End: 2025-02-16 | Stop reason: HOSPADM

## 2025-02-15 RX ORDER — NIFEDIPINE 10 MG/1
10 CAPSULE ORAL ONCE AS NEEDED
Status: DISCONTINUED | OUTPATIENT
Start: 2025-02-15 | End: 2025-02-16 | Stop reason: HOSPADM

## 2025-02-15 RX ORDER — DIPHENHYDRAMINE HCL 25 MG
25 CAPSULE ORAL EVERY 6 HOURS PRN
Status: DISCONTINUED | OUTPATIENT
Start: 2025-02-15 | End: 2025-02-16 | Stop reason: HOSPADM

## 2025-02-15 RX ORDER — MISOPROSTOL 200 UG/1
800 TABLET ORAL ONCE AS NEEDED
Status: DISCONTINUED | OUTPATIENT
Start: 2025-02-15 | End: 2025-02-16 | Stop reason: HOSPADM

## 2025-02-15 RX ORDER — ACETAMINOPHEN 325 MG/1
975 TABLET ORAL EVERY 6 HOURS
Status: DISCONTINUED | OUTPATIENT
Start: 2025-02-15 | End: 2025-02-16 | Stop reason: HOSPADM

## 2025-02-15 RX ORDER — TRANEXAMIC ACID 100 MG/ML
1000 INJECTION, SOLUTION INTRAVENOUS ONCE AS NEEDED
Status: DISCONTINUED | OUTPATIENT
Start: 2025-02-15 | End: 2025-02-16 | Stop reason: HOSPADM

## 2025-02-15 RX ADMIN — IBUPROFEN 600 MG: 600 TABLET ORAL at 12:32

## 2025-02-15 RX ADMIN — BENZOCAINE AND LEVOMENTHOL 1 APPLICATION: 200; 5 SPRAY TOPICAL at 18:59

## 2025-02-15 RX ADMIN — ACETAMINOPHEN 975 MG: 325 TABLET ORAL at 18:59

## 2025-02-15 RX ADMIN — IBUPROFEN 600 MG: 600 TABLET ORAL at 01:20

## 2025-02-15 RX ADMIN — IBUPROFEN 600 MG: 600 TABLET ORAL at 07:04

## 2025-02-15 RX ADMIN — IBUPROFEN 600 MG: 600 TABLET ORAL at 18:59

## 2025-02-15 RX ADMIN — Medication 1 EACH: at 18:59

## 2025-02-15 RX ADMIN — ACETAMINOPHEN 975 MG: 325 TABLET ORAL at 12:32

## 2025-02-15 RX ADMIN — Medication 1 EACH: at 01:20

## 2025-02-15 RX ADMIN — BENZOCAINE AND LEVOMENTHOL 1 APPLICATION: 200; 5 SPRAY TOPICAL at 01:20

## 2025-02-15 RX ADMIN — ACETAMINOPHEN 975 MG: 325 TABLET ORAL at 07:04

## 2025-02-15 RX ADMIN — ENOXAPARIN SODIUM 40 MG: 40 INJECTION SUBCUTANEOUS at 14:21

## 2025-02-15 RX ADMIN — ACETAMINOPHEN 975 MG: 325 TABLET ORAL at 01:20

## 2025-02-15 ASSESSMENT — PAIN SCALES - GENERAL
PAINLEVEL_OUTOF10: 0 - NO PAIN
PAINLEVEL_OUTOF10: 3
PAINLEVEL_OUTOF10: 1
PAINLEVEL_OUTOF10: 0 - NO PAIN
PAINLEVEL_OUTOF10: 0 - NO PAIN
PAINLEVEL_OUTOF10: 1

## 2025-02-15 ASSESSMENT — PAIN SCALES - WONG BAKER
WONGBAKER_NUMERICALRESPONSE: NO HURT
WONGBAKER_NUMERICALRESPONSE: NO HURT

## 2025-02-15 ASSESSMENT — PAIN DESCRIPTION - LOCATION
LOCATION: ABDOMEN

## 2025-02-15 NOTE — LACTATION NOTE
Lactation Consultant Note  Lactation Consultation  Reason for Consult: Initial assessment  Consultant Name: She CARBAJAL    Maternal Information  Has mother  before?: No  Infant to breast within first 2 hours of birth?: Yes  Exclusive Pump and Bottle Feed: No    Maternal Assessment       Infant Assessment  Infant Behavior: Deep sleep    Feeding Assessment  Nutrition Source: Breastmilk  Feeding Method: Nursing at the breast    LATCH TOOL       Breast Pump       Other OB Lactation Tools       Patient Follow-up  Inpatient Lactation Follow-up Needed : Yes    Other OB Lactation Documentation  Maternal Risk Factors: Age over 30, primiparity    Recommendations/Summary  I did not observe a fee this visit. Mom reports that baby has latched since delivery a few times and that she continues to attempt at th breast for feeds. We discussed hand expressing drops of colostrum an offering to baby via finger feed if baby is sleepy and not interested in latching. Mom encouraged to call for feed observation the next feed attempt. Mom was asked to attempt/feed baby at least every 2-3 hours or on demand with a goal of 8-12 feeds daily. Feeding cues reviewed.Breastfeeding education reviewed and questions answered. Mom aware of lactation support and asked to call out for feed assistance and with questions as needed.

## 2025-02-15 NOTE — PROGRESS NOTES
S: Patient is comfortable with epidural in place, feeling more rectal pressure and gas. Consents to cervical exam. Ok to continue laboring if she has cervical change but feeling tired.     O: FHR: 140 with moderate variability, +accelerations, variable decels, reassuring CAT II FHR tracing   Contractions: every 2-4 minutes; MVUs 170  Pitocin increased to 22mu/min  SVE: 8cm/80/0  Clear fluid    A: IUP at 40.1      Induction of labor      GBS neg      Active labor     P: Continue pitocin per protocol for adequate contractions      Plan to reassess in 1 hour or sooner as indicated      Dr. Sharp aware of patient status and plan of care    MINA Roy-NAYELY

## 2025-02-15 NOTE — PROGRESS NOTES
Postpartum Progress Note    Assessment/Plan   Geraldine Gaines is a 34 y.o., , who delivered at 40w1d gestation and is now postpartum day 1.    Meeting milestones  Amulating well  breastfeeding    Assessment & Plan  Encounter for induction of labor    AMA (advanced maternal age) multigravida 35+, third trimester ([facility])    38w6d Problems (from 24 to present)       Problem Noted Diagnosed Resolved    Encounter for induction of labor 2025 by RUSSEL Hahn  No    Priority:  Medium       AMA (advanced maternal age) multigravida 35+, third trimester ([facility]) 2025 by RUSSEL Hahn  No    Priority:  Medium       Poor fetal growth affecting management of mother, antepartum ([facility]) 2024 by RSUSEL Badillo  No    Priority:  Medium       Overview Addendum 2025  7:37 PM by RUSSEL Badillo     24: Decreased interval fetal growth at 30 weeks. The Ac is at the 2%, and the EFW is at the 7% percentile Repeat doppler, BPP and AFV evaluation scheduled weekly [facility]. SD  24: at 33 weeks EFW 16th% with AC at 17th%. SD  25: 36 week sono: Appropriate fetal growth: 2526 g at the 22%, AC 15%, normal AVELINO. Continue weekly NST per [place] protocol. SD         38 weeks gestation of pregnancy ([facility]) 2024 by RUSSEL Badillo  No    Priority:  Medium       Overview Addendum 2025 10:09 AM by Charity De Luna     2024: Desired provider in labor: [x] CNM  [x] Physician   [x] Initial BMI: >40  [x] Prenatal Labs: Done at [facility]   [x] Rh status: positive  [x] Genetic Screening:  Increased risk for SMA but declines additional testing, NIPT not done   [x] Baby ASA  [x] Dating confirmation with US    [x] Anatomy US: ordered   [] Patient added to BirthTracks: Doc pt   [x] 1hr GCT at 24-28wks: normal   [] 3 hr GTT (if indicated):  [x] Rhogam (if indicated): A pos   [x] Fetal Surveillance (if indicated):weekly BPP, weekly dopplers, serial  growth     [x] Tdap (27-36wks): at 1/22/25   [] RSV (32-36wks) considering   [] Flu Shot:  [x] COVID vaccine: declines     [x] Breastfeeding  [] Pain management during labor:   [x] Postpartum Birth control method: withdrawl/condoms     [x] GBS at 36 wks: 1/22/25  [x] 39 weeks discussion of IOL vs. Expectant management: pt requested 40 weeks scheduled IOL  [x] Mode of delivery: NCB             Elevated hemoglobin A1c 9/11/2024 by RUSSEL Badillo  No    Priority:  Medium       Overview Addendum 9/11/2024  2:31 PM by RUSSEL Shaikh     9/11/2024: 6.2 at NOB. 3 hr gct normal SC           Maternal obesity syndrome, antepartum ([facility]) 9/11/2024 by RUSSEL Badillo  No    Priority:  Medium       Overview Signed 9/11/2024  1:50 PM by RUSSEL Shaikh     9/11/2024: BMI = >40 at NOB    Fetal surveillance BMI >40 at NOB:  Growth US at 30 and 36 wks  BPP or NST weekly 34 wks to delivery    Timing of delivery: 39 0/7 - 39 6/7 wks  *BMI >= 50 at any time in pregnancy: Deliver at Level 4             Abnormal genetic test 9/11/2024 by RUSSEL Badillo  No    Priority:  Medium       Overview Signed 9/11/2024  1:50 PM by RUSSEL Shaikh     9/11/2024: Increased risk for SMA carrier status. Genetic consult complete. SC           Migraines 9/11/2024 by RUSSEL Badillo  No    Priority:  Medium       Overview Signed 9/11/2024  1:59 PM by RUSSEL Shaikh     9/11/2024: Tylenol PRN           FH: ovarian cancer 9/11/2024 by RUSSEL Badillo  No    Priority:  Medium       Overview Signed 9/11/2024  2:34 PM by RUSSEL Shaikh     9/11/2024: Maternal aunt passed away at age 44 from ovarian cancer. Pt mother is a carrier and had a hysterectomy. Pt has not yet been tested. Will plan My Risk postpartum. SC           Ovarian cancer (Multi) 9/11/2024 by RUSSEL Badillo  9/11/2024 by RUSSEL Badillo    Priority:  Medium        Overview Signed 9/11/2024  2:33 PM by RUSSEL Shaikh .td               Hospital course: no complications  Vaginal Birth  Patient is currently breastfeedingThe patient's blood type is A POS. Rhogam is not indicated.    Subjective   Her pain is well controlled with current medications  She is passing flatus  She is ambulating well  She is tolerating a Adult diet Regular  She reports no breast or nursing problems  She denies emotional concerns today   Her plan for contraception is none         Objective   Allergies:   Lemon         Last Vitals:  Temp Pulse Resp BP MAP Pulse Ox   36.5 °C (97.7 °F) 79 16 123/56 73 99 %     Vitals Min/Max Last 24 Hours:  Temp  Min: 36.5 °C (97.7 °F)  Max: 37.3 °C (99.1 °F)  Pulse  Min: 67  Max: 150  Resp  Min: 15  Max: 19  BP  Min: 82/52  Max: 132/79  MAP (mmHg)  Min: 60  Max: 99    Intake/Output:     Intake/Output Summary (Last 24 hours) at 2/15/2025 0736  Last data filed at 2/15/2025 0600  Gross per 24 hour   Intake --   Output 1900 ml   Net -1900 ml       Physical Exam:  General: Examination reveals a well developed, well nourished, female, in no acute distress. She is alert and cooperative.  HEENT: PERRLA. External ears normal. Nose normal, no erythema or discharge. Mouth and throat clear.  Neck: supple, no significant adenopathy.  Fundus: below umbilicus.  Extremities: no redness or tenderness in the calves or thighs, no edema.      Lab Data:  Lab Results   Component Value Date    WBC 8.4 02/13/2025    HGB 12.4 02/13/2025    HCT 35.7 (L) 02/13/2025     02/13/2025

## 2025-02-15 NOTE — ANESTHESIA POSTPROCEDURE EVALUATION
"Patient: Geraldine Gaines \"Heena\"    Procedure Summary       Date: 02/14/25 Room / Location:     Anesthesia Start: 1230 Anesthesia Stop: 2244    Procedure: Labor Analgesia Diagnosis:     Scheduled Providers:  Responsible Provider: DOMINGUEZ Owens    Anesthesia Type: epidural ASA Status: 2            Anesthesia Type: epidural    Vitals Value Taken Time   /70 02/14/25 2355   Temp 36.8 02/14/25 2358   Pulse 110 02/14/25 2355   Resp 18 02/14/25 2358   SpO2 97 % 02/14/25 2355       Anesthesia Post Evaluation    Patient location during evaluation: bedside  Patient participation: complete - patient participated  Level of consciousness: awake and alert  Pain score: 0  Pain management: adequate  Airway patency: patent  Cardiovascular status: acceptable  Respiratory status: acceptable  Hydration status: acceptable  Postoperative Nausea and Vomiting: none        No notable events documented.    "

## 2025-02-15 NOTE — PROGRESS NOTES
S: Patient is comfortable with epidural but is feeling very tired and discouraged. She is agreeable to laboring for another 1-2 hours and if no significant change, would like to consider a . Consents to IUPC placement to better monitor contraction pattern and strength.     O: FHR: 150 with moderate variability and +accelerations; occasional quick variable decels, reassuring CAT II FHR tracing   Contractions every 2-7 minutes apart with inadequate MVUs with IUPC in place  SVE: 6cm/80%/0  Ruptured with clear fluid     A: IUP at 40.1 weeks      Induction of labor      GBS negative         P: Discussed patient status with Dr. Sharp who requested IUPC placement to better assess contracitons   Will continue pitocin per protocol  Reassess in 2 hours or sooner per patient request  Options reviewed with patient who is agreeable to continuing to labor      RUSSEL Roy

## 2025-02-15 NOTE — CARE PLAN
The patient's goals for the shift include delivery of baby    The clinical goals for the shift include delivery of healthy baby      Problem: Vaginal Birth or  Section  Goal: Fetal and maternal status remain reassuring during the birth process  Outcome: Met  Goal: Tolerate CRB for IOL placement maintenance until dislodgement/removal 12hrs after placement  Outcome: Met  Goal: Demonstrates labor coping techniques through delivery  Outcome: Met     Problem: Safety - Adult  Goal: Free from fall injury  Outcome: Met

## 2025-02-15 NOTE — L&D DELIVERY NOTE
"OB Delivery Note  2/15/2025  Geraldine Gaines \"Heena\"  34 y.o.   Vaginal, Spontaneous       Gestational Age: 40w1d  /Para:   Quantitative Blood Loss: Admission to Discharge: 200 mL (2025  6:03 PM - 2/15/2025  2:32 AM)    Walker Gaines [[id number]]      Labor Events    Rupture date/time: 2025 1040  Rupture type: Artificial  Fluid color: Clear  Fluid odor: None  Labor type: Induced Onset of Labor  Labor allowed to proceed with plans for an attempted vaginal birth?: Yes  Induction: Misoprostol, [place]/EASI, AROM  Complications: None       Labor Event Times    Dilation complete date/time: 2025  Start pushing date/time: 2025       Labor Length    2nd stage: 0h 40m  3rd stage: 0h 12m       Placenta    Placenta delivery date/time: 2025  Placenta removal: Spontaneous  Placenta appearance: Intact  Placenta disposition: discarded       Cord    Complications: None  Delayed cord clamping?: Yes  Cord clamped date/time: 2025 22:45:00  Cord blood disposition: Discarded  Gases sent?: No  Stem cell collection (by provider): No       Lacerations    Episiotomy: None  Perineal laceration: None  Other lacerations?: No  Repair suture: None       Anesthesia    Method: Epidural       Operative Delivery    Forceps attempted?: No  Vacuum extractor attempted?: No       Shoulder Dystocia    Shoulder dystocia present?: No        Delivery    Time head delivered: 2025 22:43:00  Birth date/time: 2025 22:44:00  Delivery type: Vaginal, Spontaneous  Complications: None       Resuscitation    Method: Suctioning, Tactile stimulation       Apgars    Living status: Living  Apgar Component Scores:  1 min.:  5 min.:  10 min.:  15 min.:  20 min.:    Skin color:  0  1       Heart rate:  2  2       Reflex irritability:  2  2       Muscle tone:  2  2       Respiratory effort:  2  2       Total:  8  9       Apgars assigned by: ZENON RAMIREZ       Delivery Providers    Delivering " clinician: RUSSEL Purcell   Provider Role    Emma Rodriguez, RN Delivery Nurse    Leobardo Banegas, RN Nursery Nurse     Resident                     RUSSEL Purcell

## 2025-02-15 NOTE — CARE PLAN
The patient's goals for the shift include infant bonding, breastfeeding    The clinical goals for the shift include pain management    Problem: Safety - Adult  Goal: Free from fall injury  Outcome: Met     Problem: Postpartum  Goal: Experiences normal postpartum course  Outcome: Progressing  Goal: Appropriate maternal -  bonding  Outcome: Progressing  Goal: Establish and maintain infant feeding pattern for adequate nutrition  Outcome: Progressing  Goal: Incisions, wounds, or drain sites healing without S/S of infection  Outcome: Progressing  Goal: No s/sx infection  Outcome: Progressing  Goal: No s/sx of hemorrhage  Outcome: Progressing  Goal: Minimal s/sx of HDP and BP<160/110  Outcome: Progressing     Problem: Postpartum hemorrhage  Goal: Hemodynamic stability and limit blood loss  Outcome: Progressing     Problem: Pain - Adult  Goal: Verbalizes/displays adequate comfort level or baseline comfort level  Outcome: Progressing     Problem: Safety - Adult  Goal: Free from fall injury  Outcome: Progressing     Problem: Discharge Planning  Goal: Discharge to home or other facility with appropriate resources  Outcome: Progressing

## 2025-02-16 VITALS
TEMPERATURE: 97.5 F | SYSTOLIC BLOOD PRESSURE: 124 MMHG | WEIGHT: 216.6 LBS | DIASTOLIC BLOOD PRESSURE: 71 MMHG | BODY MASS INDEX: 38.38 KG/M2 | HEIGHT: 63 IN | OXYGEN SATURATION: 96 % | RESPIRATION RATE: 18 BRPM | HEART RATE: 76 BPM

## 2025-02-16 PROBLEM — Z34.90 ENCOUNTER FOR INDUCTION OF LABOR: Status: RESOLVED | Noted: 2025-02-13 | Resolved: 2025-02-16

## 2025-02-16 PROBLEM — Z3A.38 38 WEEKS GESTATION OF PREGNANCY (HHS-HCC): Status: RESOLVED | Noted: 2024-09-11 | Resolved: 2025-02-16

## 2025-02-16 PROBLEM — A60.9 HSV (HERPES SIMPLEX VIRUS) ANOGENITAL INFECTION: Status: RESOLVED | Noted: 2024-09-11 | Resolved: 2025-02-16

## 2025-02-16 PROCEDURE — 2500000001 HC RX 250 WO HCPCS SELF ADMINISTERED DRUGS (ALT 637 FOR MEDICARE OP): Performed by: OBSTETRICS & GYNECOLOGY

## 2025-02-16 RX ORDER — ACETAMINOPHEN 500 MG
1000 TABLET ORAL EVERY 6 HOURS PRN
Qty: 42 TABLET | Refills: 0 | Status: SHIPPED | OUTPATIENT
Start: 2025-02-16 | End: 2025-02-24

## 2025-02-16 RX ORDER — IBUPROFEN 200 MG
600 TABLET ORAL EVERY 6 HOURS
Qty: 84 TABLET | Refills: 0 | Status: SHIPPED | OUTPATIENT
Start: 2025-02-16 | End: 2025-02-25

## 2025-02-16 RX ADMIN — BENZOCAINE AND LEVOMENTHOL 1 APPLICATION: 200; 5 SPRAY TOPICAL at 13:31

## 2025-02-16 RX ADMIN — IBUPROFEN 600 MG: 600 TABLET ORAL at 06:50

## 2025-02-16 RX ADMIN — ACETAMINOPHEN 975 MG: 325 TABLET ORAL at 06:50

## 2025-02-16 ASSESSMENT — PAIN SCALES - GENERAL
PAINLEVEL_OUTOF10: 0 - NO PAIN
PAINLEVEL_OUTOF10: 0 - NO PAIN

## 2025-02-16 NOTE — DISCHARGE SUMMARY
Discharge Summary    Admission Date: 2/13/2025  Discharge Date: 2/16/25    Discharge Diagnosis  Encounter for induction of labor    Hospital Course  Delivery Date: 2/14/2025 10:44 PM  Delivery type: Vaginal, Spontaneous   GA at delivery: 40w1d  Outcome: Living  Anesthesia during delivery: Epidural  Intrapartum complications: None  Feeding method: Breastfeeding Status: Yes     Procedures: none  Contraception at discharge: none  Declines    Pertinent Physical Exam At Time of Discharge    General: Examination reveals a well developed, well nourished, female, in no acute distress. She is alert and cooperative.  HEENT: PERRLA. External ears normal. Nose normal, no erythema or discharge. Mouth and throat clear.  Abdomen: soft, gravid, nontender, nondistended, no abnormal masses, no epigastric pain.  Fundus: nontender.  Extremities: no redness or tenderness in the calves or thighs, no edema.  Neurological: alert, oriented, normal speech, no focal findings or movement disorder noted.  Psychological: awake and alert; oriented to person, place, and time.    Last Vitals:  Temp Pulse Resp BP MAP Pulse Ox   36.4 °C (97.5 °F) 76 18 124/71 77 96 %     Discharge Meds     Your medication list        START taking these medications        Instructions Last Dose Given Next Dose Due   acetaminophen 500 mg tablet  Commonly known as: Tylenol Extra Strength      Take 2 tablets (1,000 mg) by mouth every 6 hours if needed for mild pain (1 - 3) for up to 7 days.       ibuprofen 200 mg tablet      Take 3 tablets (600 mg) by mouth every 6 hours for 7 days.              CONTINUE taking these medications        Instructions Last Dose Given Next Dose Due   ondansetron 4 mg tablet  Commonly known as: Zofran      Take 1 tablet (4 mg) by mouth every 8 hours if needed for nausea or vomiting.       PNV no.163-iron-folate no.10 20 mg iron- 1 mg tablet                  STOP taking these medications      acyclovir 200 mg capsule  Commonly known as:  Zovirax        terconazole 0.8 % vaginal cream  Commonly known as: Terazol 3        valACYclovir 500 mg tablet  Commonly known as: Valtrex                  Where to Get Your Medications        These medications were sent to [facility]  [address], [address], [place] [id number]      Hours: 8 AM to 6 PM Mon-Fri, 9 AM to 1 PM Saturday Phone: [phone]   acetaminophen 500 mg tablet  ibuprofen 200 mg tablet          Complications Requiring Follow-Up  None    Test Results Pending At Discharge  Pending Labs       No current pending labs.            Outpatient Follow-Up  No future appointments.    I spent 40 minutes in the professional and overall care of this patient.      Amy Carey MD

## 2025-02-16 NOTE — LACTATION NOTE
Lactation Consultant Note  Lactation Consultation  Reason for Consult: Follow-up assessment  Consultant Name: Samantha JANGShellie    Maternal Information  Has mother  before?: No  Infant to breast within first 2 hours of birth?: Yes  Exclusive Pump and Bottle Feed: No    Maternal Assessment  Breast Assessment: Large, Compressible  Nipple Assessment: Intact, Erect  Areola Assessment: Normal    Infant Assessment  Infant Behavior: Deep sleep  Infant Assessment: Good cupping of tongue (not opening wide and clenching)    Feeding Assessment  Nutrition Source: Breastmilk, Formula (medically indicated)  Feeding Method: Nursing at the breast, Feeding expressed breastmilk, Paced bottle  Feeding Position: Mother needs assistance with latch/positioning, Cross - cradle, Breast sandwich  Suck/Feeding: Does not suckle, Unsustained  Latch Assessment: Mouth not open wide enough, Clenched jaws    LATCH TOOL  Latch: Too sleepy or reluctant, no latch achieved  Audible Swallowing: None  Type of Nipple: Everted (After stimulation)  Comfort (Breast/Nipple): Soft/non-tender  Hold (Positioning): Minimal assist, teach one side, mother does other, staff holds  LATCH Score: 5    Breast Pump  Pump: Hospital grade electric pump  Frequency:  (Each time the baby does not latch and formula is given)  Duration: 15-20 minutes per session  Breast Shield Size and Type: 24 mm  Units of Volume: Drops    Other OB Lactation Tools       Patient Follow-up  Inpatient Lactation Follow-up Needed : No  Outpatient Lactation Follow-up: Recommended  Lactation Professional - OK to Discharge: Yes    Other OB Lactation Documentation  Maternal Risk Factors: Age over 30, primiparity    Recommendations/Summary  Met with mom this morning and baby was sleepy and reluctant to latch. Demonstrated how to finger feed expressed drops of colostrum. Baby did not wake up after multiple drops. For another latch attempt, baby rooting and giving hunger cues. Baby not opening wide and  clenching her jaws. Finger suck training demonstrated. Baby was only able to open wide enough to get the nipple in the mouth and unable to offer any sucks at the breast. Recommended mom follow the following feeding plan:  Try at the breast at early hunger cues or every 2-3 hours  If baby is unable to latch and sustain, supplement with 5-15 mL of formula via syringe feeding or paced bottle feeding. Increase supplement amount to 15-30 mL per feeding after 48 HOL.  Pump both breasts for 15 minutes and store breast milk appropriately.    Supplemental guideline reviewed and breast milk storage guidelines. Breast pump settings reviewed and washing instructions given.

## 2025-02-16 NOTE — CARE PLAN
The patient's goals for the shift include infant care and bonding    The clinical goals for the shift include Patient free from injury      Problem: Postpartum  Goal: Experiences normal postpartum course  Outcome: Met  Goal: Appropriate maternal -  bonding  Outcome: Met  Goal: Establish and maintain infant feeding pattern for adequate nutrition  Outcome: Met  Goal: Incisions, wounds, or drain sites healing without S/S of infection  Outcome: Met  Goal: No s/sx infection  Outcome: Met  Goal: No s/sx of hemorrhage  Outcome: Met  Goal: Minimal s/sx of HDP and BP<160/110  Outcome: Met     Problem: Postpartum hemorrhage  Goal: Hemodynamic stability and limit blood loss  Outcome: Met     Problem: Pain - Adult  Goal: Verbalizes/displays adequate comfort level or baseline comfort level  Outcome: Met     Problem: Safety - Adult  Goal: Free from fall injury  Outcome: Met     Problem: Discharge Planning  Goal: Discharge to home or other facility with appropriate resources  Outcome: Met      No bruits; no thyromegaly or nodules

## 2025-02-17 PROCEDURE — RXMED WILLOW AMBULATORY MEDICATION CHARGE

## 2025-02-18 ENCOUNTER — PHARMACY VISIT (OUTPATIENT)
Dept: PHARMACY | Facility: CLINIC | Age: 35
End: 2025-02-18
Payer: COMMERCIAL

## 2025-04-16 ENCOUNTER — POSTPARTUM VISIT (OUTPATIENT)
Dept: OBSTETRICS AND GYNECOLOGY | Facility: HOSPITAL | Age: 35
End: 2025-04-16
Payer: COMMERCIAL

## 2025-04-16 VITALS
HEART RATE: 83 BPM | BODY MASS INDEX: 37.05 KG/M2 | WEIGHT: 217 LBS | DIASTOLIC BLOOD PRESSURE: 85 MMHG | SYSTOLIC BLOOD PRESSURE: 133 MMHG | HEIGHT: 64 IN

## 2025-04-16 DIAGNOSIS — Z11.3 ROUTINE SCREENING FOR STI (SEXUALLY TRANSMITTED INFECTION): ICD-10-CM

## 2025-04-16 PROBLEM — R89.8 ABNORMAL GENETIC TEST: Status: RESOLVED | Noted: 2024-09-11 | Resolved: 2025-04-16

## 2025-04-16 PROBLEM — G43.909 MIGRAINES: Status: RESOLVED | Noted: 2024-09-11 | Resolved: 2025-04-16

## 2025-04-16 PROBLEM — Z80.41 FH: OVARIAN CANCER: Status: RESOLVED | Noted: 2024-09-11 | Resolved: 2025-04-16

## 2025-04-16 PROBLEM — R73.09 ELEVATED HEMOGLOBIN A1C: Status: RESOLVED | Noted: 2024-09-11 | Resolved: 2025-04-16

## 2025-04-16 PROBLEM — O36.5990 POOR FETAL GROWTH AFFECTING MANAGEMENT OF MOTHER, ANTEPARTUM (HHS-HCC): Status: RESOLVED | Noted: 2024-12-06 | Resolved: 2025-04-16

## 2025-04-16 PROBLEM — O99.210 MATERNAL OBESITY SYNDROME, ANTEPARTUM (HHS-HCC): Status: RESOLVED | Noted: 2024-09-11 | Resolved: 2025-04-16

## 2025-04-16 PROBLEM — O09.523 AMA (ADVANCED MATERNAL AGE) MULTIGRAVIDA 35+, THIRD TRIMESTER (HHS-HCC): Status: RESOLVED | Noted: 2025-02-13 | Resolved: 2025-04-16

## 2025-04-16 PROCEDURE — 99213 OFFICE O/P EST LOW 20 MIN: CPT

## 2025-04-16 PROCEDURE — 0503F POSTPARTUM CARE VISIT: CPT

## 2025-04-16 RX ORDER — ACETAMINOPHEN 500 MG
TABLET ORAL EVERY 6 HOURS PRN
COMMUNITY

## 2025-04-16 SDOH — ECONOMIC STABILITY: FOOD INSECURITY: WITHIN THE PAST 12 MONTHS, THE FOOD YOU BOUGHT JUST DIDN'T LAST AND YOU DIDN'T HAVE MONEY TO GET MORE.: NEVER TRUE

## 2025-04-16 SDOH — ECONOMIC STABILITY: FOOD INSECURITY: WITHIN THE PAST 12 MONTHS, YOU WORRIED THAT YOUR FOOD WOULD RUN OUT BEFORE YOU GOT MONEY TO BUY MORE.: NEVER TRUE

## 2025-04-16 SDOH — ECONOMIC STABILITY: TRANSPORTATION INSECURITY
IN THE PAST 12 MONTHS, HAS LACK OF TRANSPORTATION KEPT YOU FROM MEETINGS, WORK, OR FROM GETTING THINGS NEEDED FOR DAILY LIVING?: NO

## 2025-04-16 SDOH — ECONOMIC STABILITY: TRANSPORTATION INSECURITY
IN THE PAST 12 MONTHS, HAS THE LACK OF TRANSPORTATION KEPT YOU FROM MEDICAL APPOINTMENTS OR FROM GETTING MEDICATIONS?: NO

## 2025-04-16 ASSESSMENT — LIFESTYLE VARIABLES
HOW MANY STANDARD DRINKS CONTAINING ALCOHOL DO YOU HAVE ON A TYPICAL DAY: PATIENT DOES NOT DRINK
HOW OFTEN DO YOU HAVE SIX OR MORE DRINKS ON ONE OCCASION: NEVER
AUDIT-C TOTAL SCORE: 0
SKIP TO QUESTIONS 9-10: 1
HOW OFTEN DO YOU HAVE A DRINK CONTAINING ALCOHOL: NEVER

## 2025-04-16 ASSESSMENT — PATIENT HEALTH QUESTIONNAIRE - PHQ9
1. LITTLE INTEREST OR PLEASURE IN DOING THINGS: NOT AT ALL
2. FEELING DOWN, DEPRESSED OR HOPELESS: NOT AT ALL
SUM OF ALL RESPONSES TO PHQ9 QUESTIONS 1 & 2: 0

## 2025-04-16 ASSESSMENT — PAIN SCALES - GENERAL: PAINLEVEL_OUTOF10: 5

## 2025-04-16 NOTE — PROGRESS NOTES
Subjective   34 y.o.  presenting for postpartum follow-up     Delivery Date: 2025  Gestational Age: 40w1d   Type of Delivery: Vaginal, Spontaneous     38w6d Problems (from 24 to 25)       Problem Noted Diagnosed Resolved    Encounter for induction of labor 2025 by MINA Hahn-JOHNATHAN  2025 by Amy Carey MD    AMA (advanced maternal age) multigravida 35+, third trimester ([facility]) 2025 by MINA Hahn-CN  2025 by Aidee Irwin LPN    Poor fetal growth affecting management of mother, antepartum ([facility]) 2024 by MINA Badillo-JOHNATHAN  2025 by Aidee Irwin LPN    Overview Addendum 2025  7:37 PM by Kacey Forte, APRN-CN   24: Decreased interval fetal growth at 30 weeks. The Ac is at the 2%, and the EFW is at the 7% percentile Repeat doppler, BPP and AFV evaluation scheduled weekly per [facility]. SD  24: at 33 weeks EFW 16th% with AC at 17th%. SD  25: 36 week sono: Appropriate fetal growth: 2526 g at the 22%, AC 15%, normal AVELINO. Continue weekly NST per Cross protocol. SD         38 weeks gestation of pregnancy ([facility]) 2024 by Kacey Forte, APRN-CN  2025 by Amy Carey MD    Overview Addendum 2025 10:09 AM by Charity De Luna   2024: Desired provider in labor: [x] CNM  [x] Physician   [x] Initial BMI: >40  [x] Prenatal Labs: Done at [facility]   [x] Rh status: positive  [x] Genetic Screening:  Increased risk for SMA but declines additional testing, NIPT not done   [x] Baby ASA  [x] Dating confirmation with US    [x] Anatomy US: ordered   [] Patient added to BirthTracks: Doc pt   [x] 1hr GCT at 24-28wks: normal   [] 3 hr GTT (if indicated):  [x] Rhogam (if indicated): A pos   [x] Fetal Surveillance (if indicated):weekly BPP, weekly dopplers, serial growth     [x] Tdap (27-36wks): at 25   [] RSV (32-36wks) considering   [] Flu Shot:  [x] COVID vaccine: declines     [x] Breastfeeding  [] Pain  management during labor:   [x] Postpartum Birth control method: withdrawl/condoms     [x] GBS at 36 wks: 1/22/25  [x] 39 weeks discussion of IOL vs. Expectant management: pt requested 40 weeks scheduled IOL  [x] Mode of delivery: NCB             Elevated hemoglobin A1c 9/11/2024 by RUSSEL Badillo  4/16/2025 by Aidee Irwin LPN    Overview Addendum 9/11/2024  2:31 PM by RUSSEL Shaikh   9/11/2024: 6.2 at NOB. 3 hr gct normal SC           Maternal obesity syndrome, antepartum ([facility]) 9/11/2024 by RUSSEL Badillo  4/16/2025 by Aidee Irwin LPN    Overview Signed 9/11/2024  1:50 PM by RUSSEL Shaikh   9/11/2024: BMI = >40 at NOB    Fetal surveillance BMI >40 at NOB:  Growth US at 30 and 36 wks  BPP or NST weekly 34 wks to delivery    Timing of delivery: 39 0/7 - 39 6/7 wks  *BMI >= 50 at any time in pregnancy: Deliver at Level 4             Abnormal genetic test 9/11/2024 by RUSSEL Badillo  4/16/2025 by Aidee Irwin LPN    Overview Signed 9/11/2024  1:50 PM by RUSSEL Shaikh   9/11/2024: Increased risk for SMA carrier status. Genetic consult complete. SC           Migraines 9/11/2024 by RUSSEL Badillo  4/16/2025 by Aidee Irwin LPN    Overview Signed 9/11/2024  1:59 PM by RUSSEL Shaikh   9/11/2024: Tylenol PRN           Ovarian cancer (Multi) 9/11/2024 by RUSSEL Badillo  9/11/2024 by RUSSEL Badillo    Overview Signed 9/11/2024  2:33 PM by RUSSEL Shaikh   .td         FH: ovarian cancer 9/11/2024 by RUSSEL Badillo  4/16/2025 by Aidee Irwin LPN    Overview Signed 9/11/2024  2:34 PM by MINA Shaikh-JOHNATHAN   9/11/2024: Maternal aunt passed away at age 44 from ovarian cancer. Pt mother is a carrier and had a hysterectomy. Pt has not yet been tested. Will plan My Risk postpartum. SC                   Concerns: no concerns    Pain: controlled  Lacerations:  "none  Lochia: ceased with menses returning on 4/15/2025  Sexual Intimacy: No  Contraceptive Method: None  Feeding Method: She is breast feeding exclusively. no breast or nursing problems  Lactation Consult Needed?: No    Birth Trauma: No  Bonding with Baby: well with baby girlAleah    Mood:   Postpartum Depression: Low Risk  (2024)    [place]  Depression Scale     Last EPDS Total Score: 2     Last EPDS Self Harm Result: Never     Last pap: 2024; WNL due in     Objective    /85 (BP Location: Left arm, Patient Position: Sitting, BP Cuff Size: Large adult long)   Pulse 83   Ht 1.626 m (5' 4\")   Wt 98.4 kg (217 lb)   LMP 04/15/2025 (Exact Date)   Breastfeeding Yes   BMI 37.25 kg/m²    Physical Exam  Constitutional:       Appearance: Normal appearance.   Genitourinary:      Genitourinary Comments: Patient currently on menses; did not wish to have pelvic exam performed today   HENT:      Head: Normocephalic and atraumatic.      Mouth/Throat:      Mouth: Mucous membranes are moist.   Cardiovascular:      Rate and Rhythm: Normal rate and regular rhythm.      Heart sounds: Normal heart sounds.   Pulmonary:      Effort: Pulmonary effort is normal.      Breath sounds: Normal breath sounds.   Abdominal:      Comments: Uterus not palpated   Musculoskeletal:         General: Normal range of motion.      Cervical back: Normal range of motion.   Neurological:      Mental Status: She is alert and oriented to person, place, and time.   Skin:     General: Skin is warm and dry.   Psychiatric:         Mood and Affect: Mood normal.         Behavior: Behavior normal.         Thought Content: Thought content normal.         Judgment: Judgment normal.          Plan    Advised to call office for breast complaints, abnormal bleeding, mood changes, or other concerning symptoms.   Cleared to resume normal activity as desired    Problem List Items Addressed This Visit           ICD-10-CM    Routine " postpartum follow-up - Primary Z39.2     Other Visit Diagnoses         Codes      Routine screening for STI (sexually transmitted infection)     Z11.3    Relevant Orders    Trichomonas vaginalis, Amplified    C. trachomatis / N. gonorrhoeae, Amplified, Urogenital            Gabriela Ferguson, MINA-CN

## 2025-04-17 LAB
C TRACH RRNA SPEC QL NAA+PROBE: NOT DETECTED
N GONORRHOEA RRNA SPEC QL NAA+PROBE: NOT DETECTED
QUEST GC CT AMPLIFIED (ALWAYS MESSAGE): NORMAL
T VAGINALIS RRNA SPEC QL NAA+PROBE: NOT DETECTED